# Patient Record
Sex: MALE | Race: WHITE | Employment: OTHER | URBAN - NONMETROPOLITAN AREA
[De-identification: names, ages, dates, MRNs, and addresses within clinical notes are randomized per-mention and may not be internally consistent; named-entity substitution may affect disease eponyms.]

---

## 2017-09-19 ENCOUNTER — OFFICE VISIT (OUTPATIENT)
Dept: CHIROPRACTIC MEDICINE | Facility: OTHER | Age: 67
End: 2017-09-19
Attending: CHIROPRACTOR
Payer: COMMERCIAL

## 2017-09-19 DIAGNOSIS — M99.02 SEGMENTAL AND SOMATIC DYSFUNCTION OF THORACIC REGION: ICD-10-CM

## 2017-09-19 DIAGNOSIS — M99.03 SEGMENTAL AND SOMATIC DYSFUNCTION OF LUMBAR REGION: Primary | ICD-10-CM

## 2017-09-19 DIAGNOSIS — M54.50 ACUTE BILATERAL LOW BACK PAIN WITHOUT SCIATICA: ICD-10-CM

## 2017-09-19 PROCEDURE — 98940 CHIROPRACT MANJ 1-2 REGIONS: CPT | Mod: AT | Performed by: CHIROPRACTOR

## 2017-09-19 NOTE — MR AVS SNAPSHOT
"              After Visit Summary   9/19/2017    Sree Chris    MRN: [id number]           Patient Information     Date Of Birth          1950        Visit Information        Provider Department      9/19/2017 10:40 AM Deo Richardson DC  [facility] [place]        Today's Diagnoses     Segmental and somatic dysfunction of lumbar region    -  1    Acute bilateral low back pain without sciatica        Segmental and somatic dysfunction of thoracic region           Follow-ups after your visit        Your next 10 appointments already scheduled     Sep 25, 2017 10:00 AM CDT   Return Visit with Deo Richardson DC   [facility] [place] ([facility]    [address]  [place] [address]   [phone]              Who to contact     If you have questions or need follow up information about today's clinic visit or your schedule please contact  [facility] [place] directly at [phone].  Normal or non-critical lab and imaging results will be communicated to you by CloudVerticalhart, letter or phone within 4 business days after the clinic has received the results. If you do not hear from us within 7 days, please contact the clinic through MyChart or phone. If you have a critical or abnormal lab result, we will notify you by phone as soon as possible.  Submit refill requests through ArtSetters or call your pharmacy and they will forward the refill request to us. Please allow 3 business days for your refill to be completed.          Additional Information About Your Visit        MyChart Information     ArtSetters lets you send messages to your doctor, view your test results, renew your prescriptions, schedule appointments and more. To sign up, go to [email or web address] . Click on \"Log in\" on the left side of the screen, which will take you to the Welcome page. Then click on \"Sign up Now\" on the right side of the page.     You will be asked to enter the access code listed below, as well as some personal " information. Please follow the directions to create your username and password.     Your access code is: [email or web address]  Expires: 2017  2:43 PM     Your access code will  in 90 days. If you need help or a new code, please call your [place] clinic or [phone].        Care EveryWhere ID     This is your Care EveryWhere ID. This could be used by other organizations to access your [place] medical records  [id number]         Blood Pressure from Last 3 Encounters:   14 127/69   13 177/95    Weight from Last 3 Encounters:   14 196 lb (88.9 kg)              We Performed the Following     CHIROPRAC MANIP,SPINAL,1-2 REGIONS        Primary Care Provider Office Phone # Fax #    Sree Mcclain [phone]       [facility] [address] [facility] [address]        Equal Access to Services     [facility]: Hadii aad ku hadasho Soomaali, waaxda luqadaha, qaybta kaalmada adeegyada, waxay alemin haybirgitn charla doherty . So [facility] [phone].    ATENCIÓN: Si habla español, tiene a camarena disposición servicios gratuitos de asistencia lingüística. Maria Del Carmen al [phone].    We comply with applicable federal civil rights laws and [place] laws. We do not discriminate on the basis of race, color, national origin, age, disability sex, sexual orientation or gender identity.            Thank you!     Thank you for choosing  [facility]  for your care. Our goal is always to provide you with excellent care. Hearing back from our patients is one way we can continue to improve our services. Please take a few minutes to complete the written survey that you may receive in the mail after your visit with us. Thank you!             Your Updated Medication List - Protect others around you: Learn how to safely use, store and throw away your medicines at www.disposemymeds.org.          This list is accurate as of: 17 11:59 PM.  Always use your most recent med list.                    Brand Name Dispense Instructions for use Diagnosis    amLODIPine 5 MG tablet    NORVASC     Take 5 mg by mouth daily        ASPIRIN PO      Take 81 mg by mouth daily        CENTRUM SILVER per tablet      Take 1 tablet by mouth daily        GLIPIZIDE PO      Take 2.5 mg by mouth every morning (before breakfast)        glucosamine-chondroitin 500-400 MG Caps per capsule      Take 3 capsules by mouth daily        LISINOPRIL PO      Take 40 mg by mouth daily.        METFORMIN HCL PO      Take 1,000 mg by mouth 2 times daily (with meals).        OMEGA-3 FISH OIL PO      Take 1 g by mouth 2 times daily (with meals)        PRAVASTATIN SODIUM PO      Take 20 mg by mouth daily.        PRILOSEC PO      Take 40 mg by mouth every morning.        VITAMIN D3 PO      Take 1,000 Units by mouth daily

## 2017-09-21 ENCOUNTER — OFFICE VISIT (OUTPATIENT)
Dept: CHIROPRACTIC MEDICINE | Facility: OTHER | Age: 67
End: 2017-09-21
Attending: CHIROPRACTOR
Payer: COMMERCIAL

## 2017-09-21 DIAGNOSIS — M54.50 ACUTE BILATERAL LOW BACK PAIN WITHOUT SCIATICA: ICD-10-CM

## 2017-09-21 DIAGNOSIS — M99.02 SEGMENTAL AND SOMATIC DYSFUNCTION OF THORACIC REGION: ICD-10-CM

## 2017-09-21 DIAGNOSIS — M99.03 SEGMENTAL AND SOMATIC DYSFUNCTION OF LUMBAR REGION: Primary | ICD-10-CM

## 2017-09-21 PROCEDURE — 98940 CHIROPRACT MANJ 1-2 REGIONS: CPT | Mod: AT | Performed by: CHIROPRACTOR

## 2017-09-21 NOTE — MR AVS SNAPSHOT
"              After Visit Summary   9/21/2017    Sree Chris    MRN: [id number]           Patient Information     Date Of Birth          1950        Visit Information        Provider Department      9/21/2017 9:30 AM Deo Richardson DC  [facility] [place]        Today's Diagnoses     Segmental and somatic dysfunction of lumbar region    -  1    Acute bilateral low back pain without sciatica        Segmental and somatic dysfunction of thoracic region           Follow-ups after your visit        Your next 10 appointments already scheduled     Sep 25, 2017 10:00 AM CDT   Return Visit with Deo Richardson DC   [facility] [place] ([facility])    [address]  [place] [address]   [phone]              Who to contact     If you have questions or need follow up information about today's clinic visit or your schedule please contact  [facility] [place] directly at [phone].  Normal or non-critical lab and imaging results will be communicated to you by Veeboxhart, letter or phone within 4 business days after the clinic has received the results. If you do not hear from us within 7 days, please contact the clinic through MyChart or phone. If you have a critical or abnormal lab result, we will notify you by phone as soon as possible.  Submit refill requests through Gongpingjia or call your pharmacy and they will forward the refill request to us. Please allow 3 business days for your refill to be completed.          Additional Information About Your Visit        MyChart Information     Gongpingjia lets you send messages to your doctor, view your test results, renew your prescriptions, schedule appointments and more. To sign up, go to [email or web address] . Click on \"Log in\" on the left side of the screen, which will take you to the Welcome page. Then click on \"Sign up Now\" on the right side of the page.     You will be asked to enter the access code listed below, as well as some personal " information. Please follow the directions to create your username and password.     Your access code is: [email or web address]  Expires: 2017  2:43 PM     Your access code will  in 90 days. If you need help or a new code, please call your [place] clinic or [phone].        Care EveryWhere ID     This is your Care EveryWhere ID. This could be used by other organizations to access your [place] medical records  [id number]         Blood Pressure from Last 3 Encounters:   14 127/69   13 177/95    Weight from Last 3 Encounters:   14 196 lb (88.9 kg)              We Performed the Following     CHIROPRAC MANIP,SPINAL,1-2 REGIONS        Primary Care Provider Office Phone # Fax #    Sree Mcclain [phone]       [facility] [address] [facility] [address]        Equal Access to Services     [facility]: Hadii aad ku hadasho Soomaali, waaxda luqadaha, qaybta kaalmada adeegyada, waxay alemin haybirgitn charla doherty . So [facility] [phone].    ATENCIÓN: Si habla español, tiene a camarena disposición servicios gratuitos de asistencia lingüística. Maria Del Carmen al [phone].    We comply with applicable federal civil rights laws and [place] laws. We do not discriminate on the basis of race, color, national origin, age, disability sex, sexual orientation or gender identity.            Thank you!     Thank you for choosing  [facility]  for your care. Our goal is always to provide you with excellent care. Hearing back from our patients is one way we can continue to improve our services. Please take a few minutes to complete the written survey that you may receive in the mail after your visit with us. Thank you!             Your Updated Medication List - Protect others around you: Learn how to safely use, store and throw away your medicines at www.disposemymeds.org.          This list is accurate as of: 17 11:59 PM.  Always use your most recent med list.                    Brand Name Dispense Instructions for use Diagnosis    amLODIPine 5 MG tablet    NORVASC     Take 5 mg by mouth daily        ASPIRIN PO      Take 81 mg by mouth daily        CENTRUM SILVER per tablet      Take 1 tablet by mouth daily        GLIPIZIDE PO      Take 2.5 mg by mouth every morning (before breakfast)        glucosamine-chondroitin 500-400 MG Caps per capsule      Take 3 capsules by mouth daily        LISINOPRIL PO      Take 40 mg by mouth daily.        METFORMIN HCL PO      Take 1,000 mg by mouth 2 times daily (with meals).        OMEGA-3 FISH OIL PO      Take 1 g by mouth 2 times daily (with meals)        PRAVASTATIN SODIUM PO      Take 20 mg by mouth daily.        PRILOSEC PO      Take 40 mg by mouth every morning.        VITAMIN D3 PO      Take 1,000 Units by mouth daily

## 2017-09-21 NOTE — PROGRESS NOTES
Subjective Finding:    Chief compalint: No chief complaint on file.  , Pain Scale: 6/10, Intensity: sharp, Duration: 2 weeks, Radiating: bilateral buttock.    Date of injury:     Activities that the pain restricts:   Home/household/hobbies/social activities: yes.  Work duties: yes.  Sleep: no.  Makes symptoms better: rest.  Makes symptoms worse: activity.  Have you seen anyone else for the symptoms? MD.  Work related: no.  Automobile related injury: no.    Objective and Assessment:    Posture Analysis:   High shoulder: .  Head tilt: .  High iliac crest: .  Head carriage: neutral.  Thoracic Kyphosis: neutral.  Lumbar Lordosis: forward.    Lumbar Range of Motion: flexion decreased and extension decreased.  Cervical Range of Motion: .  Thoracic Range of Motion: .  Extremity Range of Motion: .    Palpation:   Quad lumb: bilateral, referred pain: no    Segmental dysfunction pre-treatment and treatment area: T9, L4 and L5.    Assessment post-treatment:  Cervical: .  Thoracic: ROM increased.  Lumbar: ROM increased.    Comments: .      Complicating Factors: .    Procedure(s):  CMT:  27218 Chiropractic manipulative treatment 1-2 regions performed   Thoracic: Diversified, See above for level, Prone and Lumbar: Diversified, See above for level, Side posture    Modalities:  None performed this visit    Therapeutic procedures:  None    Plan:  Treatment plan: PRN.  Instructed patient: stretch as instructed at visit.  Short term goals: reduce pain.  Long term goals: increase ADL.  Prognosis: very good.

## 2017-09-25 ENCOUNTER — OFFICE VISIT (OUTPATIENT)
Dept: CHIROPRACTIC MEDICINE | Facility: OTHER | Age: 67
End: 2017-09-25
Attending: CHIROPRACTOR
Payer: COMMERCIAL

## 2017-09-25 DIAGNOSIS — M99.02 SEGMENTAL AND SOMATIC DYSFUNCTION OF THORACIC REGION: ICD-10-CM

## 2017-09-25 DIAGNOSIS — M99.03 SEGMENTAL AND SOMATIC DYSFUNCTION OF LUMBAR REGION: Primary | ICD-10-CM

## 2017-09-25 DIAGNOSIS — M54.50 ACUTE RIGHT-SIDED LOW BACK PAIN WITHOUT SCIATICA: ICD-10-CM

## 2017-09-25 PROCEDURE — 98940 CHIROPRACT MANJ 1-2 REGIONS: CPT | Mod: AT | Performed by: CHIROPRACTOR

## 2017-09-25 NOTE — PROGRESS NOTES
Subjective Finding:    Chief compalint: Patient presents with:  Back Pain: continued right hip pain  , Pain Scale: 6/10, Intensity: sharp, Duration: 2 weeks, Radiating: bilateral buttock.    Date of injury:     Activities that the pain restricts:   Home/household/hobbies/social activities: yes.  Work duties: yes.  Sleep: no.  Makes symptoms better: rest.  Makes symptoms worse: activity.  Have you seen anyone else for the symptoms? MD.  Work related: no.  Automobile related injury: no.    Objective and Assessment:    Posture Analysis:   High shoulder: .  Head tilt: .  High iliac crest: .  Head carriage: neutral.  Thoracic Kyphosis: neutral.  Lumbar Lordosis: forward.    Lumbar Range of Motion: flexion decreased and extension decreased.  Cervical Range of Motion: .  Thoracic Range of Motion: .  Extremity Range of Motion: .    Palpation:   Quad lumb: bilateral, referred pain: no    Segmental dysfunction pre-treatment and treatment area: T9, L4 and L5.    Assessment post-treatment:  Cervical: .  Thoracic: ROM increased.  Lumbar: ROM increased.    Comments: .      Complicating Factors: .    Procedure(s):  CMT:  96395 Chiropractic manipulative treatment 1-2 regions performed   Thoracic: Diversified, See above for level, Prone and Lumbar: Diversified, See above for level, Side posture    Modalities:  None performed this visit    Therapeutic procedures:  None    Plan:  Treatment plan: PRN.  Instructed patient: stretch as instructed at visit.  Short term goals: reduce pain.  Long term goals: increase ADL.  Prognosis: very good.

## 2017-09-25 NOTE — PROGRESS NOTES
Subjective Finding:    Chief compalint: Patient presents with:  Back Pain  , Pain Scale: 6/10, Intensity: sharp, Duration: 2 weeks, Radiating: bilateral buttock.    Date of injury:     Activities that the pain restricts:   Home/household/hobbies/social activities: yes.  Work duties: yes.  Sleep: no.  Makes symptoms better: rest.  Makes symptoms worse: activity.  Have you seen anyone else for the symptoms? MD.  Work related: no.  Automobile related injury: no.    Objective and Assessment:    Posture Analysis:   High shoulder: .  Head tilt: .  High iliac crest: .  Head carriage: neutral.  Thoracic Kyphosis: neutral.  Lumbar Lordosis: forward.    Lumbar Range of Motion: flexion decreased and extension decreased.  Cervical Range of Motion: .  Thoracic Range of Motion: .  Extremity Range of Motion: .    Palpation:   Quad lumb: bilateral, referred pain: no    Segmental dysfunction pre-treatment and treatment area: T9, L4 and L5.    Assessment post-treatment:  Cervical: .  Thoracic: ROM increased.  Lumbar: ROM increased.    Comments: .      Complicating Factors: .    Procedure(s):  CMT:  17596 Chiropractic manipulative treatment 1-2 regions performed   Thoracic: Diversified, See above for level, Prone and Lumbar: Diversified, See above for level, Side posture    Modalities:  None performed this visit    Therapeutic procedures:  None    Plan:  Treatment plan: PRN.  Instructed patient: stretch as instructed at visit.  Short term goals: reduce pain.  Long term goals: increase ADL.  Prognosis: very good.

## 2017-09-25 NOTE — MR AVS SNAPSHOT
"              After Visit Summary   9/25/2017    Sree Chris    MRN: [id number]           Patient Information     Date Of Birth          1950        Visit Information        Provider Department      9/25/2017 10:00 AM Deo Richardson DC  [facility] [place]        Today's Diagnoses     Segmental and somatic dysfunction of lumbar region    -  1    Acute right-sided low back pain without sciatica        Segmental and somatic dysfunction of thoracic region           Follow-ups after your visit        Your next 10 appointments already scheduled     Sep 28, 2017  2:00 PM CDT   Return Visit with Deo Richardson DC   [facility] [place] ([facility])    [address]  [facility] [address]   [phone]              Who to contact     If you have questions or need follow up information about today's clinic visit or your schedule please contact  [facility] [place] directly at [phone].  Normal or non-critical lab and imaging results will be communicated to you by MyChart, letter or phone within 4 business days after the clinic has received the results. If you do not hear from us within 7 days, please contact the clinic through TechTurnhart or phone. If you have a critical or abnormal lab result, we will notify you by phone as soon as possible.  Submit refill requests through BioVascular or call your pharmacy and they will forward the refill request to us. Please allow 3 business days for your refill to be completed.          Additional Information About Your Visit        MyChart Information     BioVascular lets you send messages to your doctor, view your test results, renew your prescriptions, schedule appointments and more. To sign up, go to [email or web address] . Click on \"Log in\" on the left side of the screen, which will take you to the Welcome page. Then click on \"Sign up Now\" on the right side of the page.     You will be asked to enter the access code listed below, as well as some personal " information. Please follow the directions to create your username and password.     Your access code is: [email or web address]  Expires: 2017  2:43 PM     Your access code will  in 90 days. If you need help or a new code, please call your [place] clinic or [phone].        Care EveryWhere ID     This is your Care EveryWhere ID. This could be used by other organizations to access your [place] medical records  [id number]         Blood Pressure from Last 3 Encounters:   14 127/69   13 177/95    Weight from Last 3 Encounters:   14 196 lb (88.9 kg)              We Performed the Following     CHIROPRAC MANIP,SPINAL,1-2 REGIONS        Primary Care Provider Office Phone # Fax #    Sree Mcclain [phone]       [facility] [address] [facility] [address]        Equal Access to Services     [facility]: Hadii aad ku hadasho Soomaali, waaxda luqadaha, qaybta kaalmada adeegyada, waxay alemin haybirgitn charla doherty . So [facility] [phone].    ATENCIÓN: Si habla español, tiene a camarena disposición servicios gratuitos de asistencia lingüística. Maria Del Carmen al [phone].    We comply with applicable federal civil rights laws and [place] laws. We do not discriminate on the basis of race, color, national origin, age, disability sex, sexual orientation or gender identity.            Thank you!     Thank you for choosing  [facility]  for your care. Our goal is always to provide you with excellent care. Hearing back from our patients is one way we can continue to improve our services. Please take a few minutes to complete the written survey that you may receive in the mail after your visit with us. Thank you!             Your Updated Medication List - Protect others around you: Learn how to safely use, store and throw away your medicines at www.disposemymeds.org.          This list is accurate as of: 17  1:16 PM.  Always use your most recent med list.                    Brand Name Dispense Instructions for use Diagnosis    amLODIPine 5 MG tablet    NORVASC     Take 5 mg by mouth daily        ASPIRIN PO      Take 81 mg by mouth daily        CENTRUM SILVER per tablet      Take 1 tablet by mouth daily        GLIPIZIDE PO      Take 2.5 mg by mouth every morning (before breakfast)        glucosamine-chondroitin 500-400 MG Caps per capsule      Take 3 capsules by mouth daily        LISINOPRIL PO      Take 40 mg by mouth daily.        METFORMIN HCL PO      Take 1,000 mg by mouth 2 times daily (with meals).        OMEGA-3 FISH OIL PO      Take 1 g by mouth 2 times daily (with meals)        PRAVASTATIN SODIUM PO      Take 20 mg by mouth daily.        PRILOSEC PO      Take 40 mg by mouth every morning.        VITAMIN D3 PO      Take 1,000 Units by mouth daily

## 2017-09-28 ENCOUNTER — OFFICE VISIT (OUTPATIENT)
Dept: CHIROPRACTIC MEDICINE | Facility: OTHER | Age: 67
End: 2017-09-28
Attending: CHIROPRACTOR
Payer: COMMERCIAL

## 2017-09-28 DIAGNOSIS — M54.50 ACUTE RIGHT-SIDED LOW BACK PAIN WITHOUT SCIATICA: ICD-10-CM

## 2017-09-28 DIAGNOSIS — M99.02 SEGMENTAL AND SOMATIC DYSFUNCTION OF THORACIC REGION: ICD-10-CM

## 2017-09-28 DIAGNOSIS — M99.03 SEGMENTAL AND SOMATIC DYSFUNCTION OF LUMBAR REGION: Primary | ICD-10-CM

## 2017-09-28 PROCEDURE — 98940 CHIROPRACT MANJ 1-2 REGIONS: CPT | Mod: AT | Performed by: CHIROPRACTOR

## 2017-09-28 NOTE — MR AVS SNAPSHOT
"              After Visit Summary   2017    Sree Chris    MRN: [id number]           Patient Information     Date Of Birth          1950        Visit Information        Provider Department      2017 2:00 PM Deo Richardson DC  [facility]        Today's Diagnoses     Segmental and somatic dysfunction of lumbar region    -  1    Acute right-sided low back pain without sciatica        Segmental and somatic dysfunction of thoracic region           Follow-ups after your visit        Who to contact     If you have questions or need follow up information about today's clinic visit or your schedule please contact  [facility] [place] directly at [phone].  Normal or non-critical lab and imaging results will be communicated to you by So Protect Mehart, letter or phone within 4 business days after the clinic has received the results. If you do not hear from us within 7 days, please contact the clinic through So Protect Mehart or phone. If you have a critical or abnormal lab result, we will notify you by phone as soon as possible.  Submit refill requests through ShadowdCat Consulting or call your pharmacy and they will forward the refill request to us. Please allow 3 business days for your refill to be completed.          Additional Information About Your Visit        MyChart Information     [employer] lets you send messages to your doctor, view your test results, renew your prescriptions, schedule appointments and more. To sign up, go to [email or web address] . Click on \"Log in\" on the left side of the screen, which will take you to the Welcome page. Then click on \"Sign up Now\" on the right side of the page.     You will be asked to enter the access code listed below, as well as some personal information. Please follow the directions to create your username and password.     Your access code is: [email or web address]  Expires: 2017  2:43 PM     Your access code will  in 90 days. If you need help or a new code, please " call your [place] clinic or [phone].        Care EveryWhere ID     This is your Care EveryWhere ID. This could be used by other organizations to access your [place] medical records  [id number]         Blood Pressure from Last 3 Encounters:   08/01/14 127/69   05/19/13 177/95    Weight from Last 3 Encounters:   08/01/14 196 lb (88.9 kg)              We Performed the Following     CHIROPRAC MANIP,SPINAL,1-2 REGIONS        Primary Care Provider Office Phone # Fax #    Sree Mcclain [phone]       [facility] [address] [facility] [address]        Equal Access to Services     [facility]: Hadii aad ku hadasho Soomaali, waaxda luqadaha, qaybta kaalmada adeegyada, waxindira doherty . So [facility] [place] [phone].    ATENCIÓN: Si habla español, tiene a camarena disposición servicios gratuitos de asistencia lingüística. Llame al [phone].    We comply with applicable federal civil rights laws and [place] laws. We do not discriminate on the basis of race, color, national origin, age, disability, sex, sexual orientation, or gender identity.            Thank you!     Thank you for choosing  [facility]  for your care. Our goal is always to provide you with excellent care. Hearing back from our patients is one way we can continue to improve our services. Please take a few minutes to complete the written survey that you may receive in the mail after your visit with us. Thank you!             Your Updated Medication List - Protect others around you: Learn how to safely use, store and throw away your medicines at www.disposemymeds.org.          This list is accurate as of: 9/28/17 11:59 PM.  Always use your most recent med list.                   Brand Name Dispense Instructions for use Diagnosis    amLODIPine 5 MG tablet    NORVASC     Take 5 mg by mouth daily        ASPIRIN PO      Take 81 mg by mouth daily        CENTRUM SILVER per tablet      Take 1 tablet by  mouth daily        GLIPIZIDE PO      Take 2.5 mg by mouth every morning (before breakfast)        glucosamine-chondroitin 500-400 MG Caps per capsule      Take 3 capsules by mouth daily        LISINOPRIL PO      Take 40 mg by mouth daily.        METFORMIN HCL PO      Take 1,000 mg by mouth 2 times daily (with meals).        OMEGA-3 FISH OIL PO      Take 1 g by mouth 2 times daily (with meals)        PRAVASTATIN SODIUM PO      Take 20 mg by mouth daily.        PRILOSEC PO      Take 40 mg by mouth every morning.        VITAMIN D3 PO      Take 1,000 Units by mouth daily

## 2017-10-03 NOTE — PROGRESS NOTES
Subjective Finding:    Chief compalint: Patient presents with:  Back Pain: hip pain.  left side better.  still having right hip pain  , Pain Scale: 6/10, Intensity: sharp, Duration: 2 weeks, Radiating: bilateral buttock.    Date of injury:     Activities that the pain restricts:   Home/household/hobbies/social activities: yes.  Work duties: yes.  Sleep: no.  Makes symptoms better: rest.  Makes symptoms worse: activity.  Have you seen anyone else for the symptoms? MD.  Work related: no.  Automobile related injury: no.    Objective and Assessment:    Posture Analysis:   High shoulder: .  Head tilt: .  High iliac crest: .  Head carriage: neutral.  Thoracic Kyphosis: neutral.  Lumbar Lordosis: forward.    Lumbar Range of Motion: flexion decreased and extension decreased.  Cervical Range of Motion: .  Thoracic Range of Motion: .  Extremity Range of Motion: .    Palpation:   Quad lumb: bilateral, referred pain: no    Segmental dysfunction pre-treatment and treatment area: T9, L4 and L5.    Assessment post-treatment:  Cervical: .  Thoracic: ROM increased.  Lumbar: ROM increased.    Comments: .      Complicating Factors: .    Procedure(s):  CMT:  38479 Chiropractic manipulative treatment 1-2 regions performed   Thoracic: Diversified, See above for level, Prone and Lumbar: Diversified, See above for level, Side posture    Modalities:  None performed this visit    Therapeutic procedures:  None    Plan:  Treatment plan: PRN.  Instructed patient: stretch as instructed at visit.  Short term goals: reduce pain.  Long term goals: increase ADL.  Prognosis: very good.

## 2018-06-16 ENCOUNTER — HOSPITAL ENCOUNTER (EMERGENCY)
Facility: HOSPITAL | Age: 68
Discharge: HOME | End: 2018-06-16
Attending: PHYSICIAN ASSISTANT | Admitting: PHYSICIAN ASSISTANT
Payer: MEDICARE

## 2018-06-16 VITALS
OXYGEN SATURATION: 97 % | HEART RATE: 68 BPM | RESPIRATION RATE: 18 BRPM | DIASTOLIC BLOOD PRESSURE: 58 MMHG | SYSTOLIC BLOOD PRESSURE: 115 MMHG

## 2018-06-16 DIAGNOSIS — Z13.9 SCREENING FOR CONDITION: ICD-10-CM

## 2018-06-16 DIAGNOSIS — R52 BODY ACHES: ICD-10-CM

## 2018-06-16 DIAGNOSIS — M25.50 ARTHRALGIA, UNSPECIFIED JOINT: ICD-10-CM

## 2018-06-16 LAB
BASOPHILS # BLD AUTO: 0 10E9/L (ref 0–0.2)
BASOPHILS NFR BLD AUTO: 0.3 %
DIFFERENTIAL METHOD BLD: ABNORMAL
EOSINOPHIL # BLD AUTO: 0.1 10E9/L (ref 0–0.7)
EOSINOPHIL NFR BLD AUTO: 1.6 %
ERYTHROCYTE [DISTWIDTH] IN BLOOD BY AUTOMATED COUNT: 14.1 % (ref 10–15)
HCT VFR BLD AUTO: 37.9 % (ref 40–53)
HGB BLD-MCNC: 12.8 G/DL (ref 13.3–17.7)
IMM GRANULOCYTES # BLD: 0 10E9/L (ref 0–0.4)
IMM GRANULOCYTES NFR BLD: 0.3 %
LYMPHOCYTES # BLD AUTO: 2.1 10E9/L (ref 0.8–5.3)
LYMPHOCYTES NFR BLD AUTO: 23.8 %
MCH RBC QN AUTO: 31 PG (ref 26.5–33)
MCHC RBC AUTO-ENTMCNC: 33.8 G/DL (ref 31.5–36.5)
MCV RBC AUTO: 92 FL (ref 78–100)
MONOCYTES # BLD AUTO: 1.1 10E9/L (ref 0–1.3)
MONOCYTES NFR BLD AUTO: 12.7 %
NEUTROPHILS # BLD AUTO: 5.4 10E9/L (ref 1.6–8.3)
NEUTROPHILS NFR BLD AUTO: 61.3 %
NRBC # BLD AUTO: 0 10*3/UL
NRBC BLD AUTO-RTO: 0 /100
PLATELET # BLD AUTO: 219 10E9/L (ref 150–450)
RBC # BLD AUTO: 4.13 10E12/L (ref 4.4–5.9)
URATE SERPL-MCNC: 5.5 MG/DL (ref 3.5–7.2)
WBC # BLD AUTO: 8.8 10E9/L (ref 4–11)

## 2018-06-16 PROCEDURE — 87015 SPECIMEN INFECT AGNT CONCNTJ: CPT | Performed by: PHYSICIAN ASSISTANT

## 2018-06-16 PROCEDURE — 86788 WEST NILE VIRUS AB IGM: CPT

## 2018-06-16 PROCEDURE — 86618 LYME DISEASE ANTIBODY: CPT | Performed by: PHYSICIAN ASSISTANT

## 2018-06-16 PROCEDURE — 36415 COLL VENOUS BLD VENIPUNCTURE: CPT | Performed by: PHYSICIAN ASSISTANT

## 2018-06-16 PROCEDURE — G0463 HOSPITAL OUTPT CLINIC VISIT: HCPCS

## 2018-06-16 PROCEDURE — 86789 WEST NILE VIRUS ANTIBODY: CPT

## 2018-06-16 PROCEDURE — 84550 ASSAY OF BLOOD/URIC ACID: CPT | Performed by: PHYSICIAN ASSISTANT

## 2018-06-16 PROCEDURE — 85025 COMPLETE CBC W/AUTO DIFF WBC: CPT | Performed by: PHYSICIAN ASSISTANT

## 2018-06-16 PROCEDURE — 87207 SMEAR SPECIAL STAIN: CPT | Performed by: PHYSICIAN ASSISTANT

## 2018-06-16 PROCEDURE — 99203 OFFICE O/P NEW LOW 30 MIN: CPT | Performed by: PHYSICIAN ASSISTANT

## 2018-06-16 RX ORDER — KETOROLAC TROMETHAMINE 10 MG/1
10 TABLET, FILM COATED ORAL EVERY 6 HOURS PRN
Qty: 20 TABLET | Refills: 0 | Status: SHIPPED | OUTPATIENT
Start: 2018-06-16 | End: 2018-07-12

## 2018-06-16 RX ORDER — DOXYCYCLINE 100 MG/1
100 CAPSULE ORAL 2 TIMES DAILY
Qty: 42 CAPSULE | Refills: 0 | Status: SHIPPED | OUTPATIENT
Start: 2018-06-16 | End: 2019-02-25

## 2018-06-16 ASSESSMENT — ENCOUNTER SYMPTOMS
FEVER: 0
WOUND: 0
ARTHRALGIAS: 1
NECK PAIN: 0
VOMITING: 0
MYALGIAS: 1
PSYCHIATRIC NEGATIVE: 1
BACK PAIN: 0
NAUSEA: 0
RESPIRATORY NEGATIVE: 1
NECK STIFFNESS: 0
CARDIOVASCULAR NEGATIVE: 1

## 2018-06-16 NOTE — ED PROVIDER NOTES
History     Chief Complaint   Patient presents with     Arthritis     states he has a hx of gout.  co pain in both great toes and knees     The history is provided by the patient and the spouse. No  was used.     Sree Chris is a 67 year old male who is worried he has gout. Acutely he has developed multiple joint/bone/muscles aches.  Involves all his toes, both knees/ankles and left elbow. His brother has gout so the pt took 2 days of his brother's allopurinol but this did not help.  Denies any fall or direct injury. Has not travelled outside of [place]. He does not wish to try Prednisone due to his blood sugars.      Past Medical History:    Past Medical History:   Diagnosis Date     Diabetes (H)      High cholesterol      Hypertension        Past Surgical History:    History reviewed. No pertinent surgical history.    Family History:    Gout    Social History:  Marital Status:   [2]  Social History   Substance Use Topics     Smoking status: Never Smoker     Smokeless tobacco: Not on file     Alcohol use No        Medications:      amLODIPine (NORVASC) 5 MG tablet   ASPIRIN PO   Cholecalciferol (VITAMIN D3 PO)   doxycycline (VIBRAMYCIN) 100 MG capsule   GLIPIZIDE PO   ketorolac (TORADOL) 10 MG tablet   LISINOPRIL PO   METFORMIN HCL PO   Multiple Vitamins-Minerals (CENTRUM SILVER) per tablet   Omeprazole (PRILOSEC PO)   PRAVASTATIN SODIUM PO         Review of Systems   Constitutional: Negative for fever.   Respiratory: Negative.    Cardiovascular: Negative.    Gastrointestinal: Negative for nausea and vomiting.   Musculoskeletal: Positive for arthralgias, gait problem and myalgias. Negative for back pain, neck pain and neck stiffness.   Skin: Negative for rash and wound.   Psychiatric/Behavioral: Negative.        Physical Exam   BP: 115/58  Pulse: 68  Resp: 18  SpO2: 97 %      Physical Exam   Constitutional: He is oriented to person, place, and time. He appears well-developed and  well-nourished. No distress.   Cardiovascular: Normal rate.    Pulmonary/Chest: Effort normal.   Musculoskeletal:     Bilateral toes/feet/knees/left elbow: +AFROM, m/n/v intact, +AFROM w/ pain, moderate diffuse TTP. No edema/erythema/ecchymosis.    Neurological: He is alert and oriented to person, place, and time.   Skin: Skin is warm and dry. He is not diaphoretic.   Psychiatric: He has a normal mood and affect.   Nursing note and vitals reviewed.      ED Course     ED Course     Procedures               Results for orders placed or performed during the hospital encounter of 06/16/18 (from the past 24 hour(s))   CBC with platelets differential   Result Value Ref Range    WBC 8.8 4.0 - 11.0 10e9/L    RBC Count 4.13 (L) 4.4 - 5.9 10e12/L    Hemoglobin 12.8 (L) 13.3 - 17.7 g/dL    Hematocrit 37.9 (L) 40.0 - 53.0 %    MCV 92 78 - 100 fl    MCH 31.0 26.5 - 33.0 pg    MCHC 33.8 31.5 - 36.5 g/dL    RDW 14.1 10.0 - 15.0 %    Platelet Count 219 150 - 450 10e9/L    Diff Method Automated Method     % Neutrophils 61.3 %    % Lymphocytes 23.8 %    % Monocytes 12.7 %    % Eosinophils 1.6 %    % Basophils 0.3 %    % Immature Granulocytes 0.3 %    Nucleated RBCs 0 0 /100    Absolute Neutrophil 5.4 1.6 - 8.3 10e9/L    Absolute Lymphocytes 2.1 0.8 - 5.3 10e9/L    Absolute Monocytes 1.1 0.0 - 1.3 10e9/L    Absolute Eosinophils 0.1 0.0 - 0.7 10e9/L    Absolute Basophils 0.0 0.0 - 0.2 10e9/L    Abs Immature Granulocytes 0.0 0 - 0.4 10e9/L    Absolute Nucleated RBC 0.0    Uric acid   Result Value Ref Range    Uric Acid 5.5 3.5 - 7.2 mg/dL           Assessments & Plan (with Medical Decision Making)     I have reviewed the nursing notes.    I have reviewed the findings, diagnosis, plan and need for follow up with the patient.      Discharge Medication List as of 6/16/2018 12:43 PM      START taking these medications    Details   doxycycline (VIBRAMYCIN) 100 MG capsule Take 1 capsule (100 mg) by mouth 2 times daily for 21 days, Disp-42  capsule, R-0, E-Prescribe      ketorolac (TORADOL) 10 MG tablet Take 1 tablet (10 mg) by mouth every 6 hours as needed for moderate pain, Disp-20 tablet, R-0, E-Prescribe             Final diagnoses:   Body aches   Screening for condition - Uric acid negative  CBC normal  Tick/mosquito disease screening pending   Arthralgia, unspecified joint           Patient/wife verbally educated and given appropriate education sheets for the diagnoses and has no questions.  Take medications as directed.   Follow up with your Primary Care provider in one week for reevaluation, sooner if symptoms increase. if further concerns develop, return to the ER  Winsome York Certified  Physician Assistant  6/16/2018  1:22 PM  URGENT CARE CLINIC      6/16/2018   [facility]     Winsome York PA  06/16/18 [id number]

## 2018-06-16 NOTE — ED TRIAGE NOTES
Pt presents today with significant other for c/o joint pain in multiple areas but mostly in his bilateral great toes, he has a Hx of gout, but no prn's for it.

## 2018-06-16 NOTE — ED AVS SNAPSHOT
[facility]    [address]    [place] [address]    Phone:  [phone]                                       Sree Chris   MRN: [id number]    Department:  [facility]   Date of Visit:  6/16/2018           After Visit Summary Signature Page     I have received my discharge instructions, and my questions have been answered. I have discussed any challenges I see with this plan with the nurse or doctor.    ..........................................................................................................................................  Patient/Patient Representative Signature      ..........................................................................................................................................  Patient Representative Print Name and Relationship to Patient    ..................................................               ................................................  Date                                            Time    ..........................................................................................................................................  Reviewed by Signature/Title    ...................................................              ..............................................  Date                                                            Time

## 2018-06-16 NOTE — ED AVS SNAPSHOT
[facility]    [address]    [place] [address]    Phone:  [phone]                                       Sree Chirs   MRN: [id number]    Department:  [facility]   Date of Visit:  6/16/2018           Patient Information     Date Of Birth          1950        Your diagnoses for this visit were:     Body aches     Screening for condition Uric acid negative  CBC normal  Tick/mosquito disease screening pending    Arthralgia, unspecified joint        You were seen by Winsome York PA.      Follow-up Information     Follow up with Horacio Mcrae MD In 1 week.    Specialty:  Family Practice    Why:  For reevaluation, sooner if your symptoms increase    Contact information:    [facility]  [address] [facility] [address]  [phone]          Follow up with [facility].    Specialty:  EMERGENCY MEDICINE    Why:  if further concerns develop    Contact information:    [address]  [place] [place] [address] [phone]    Additional information:    From [place]: Take [address]. Turn left at [address]. Turn left at the first stoplight on [address] [place] [address]. At the first stop sign, take a right onto [address]. Take a left into the parking lot and continue through until you reach the North enterance of the building.       From [place]: Take [address]. Take the [address] ramp towards [facility] [employer]. Turn left onto [address]. Take a slight right onto [address] [facility]. Turn left at the first stoplight on [address]. At the first stop sign, take a right onto [address]. Take a left into the parking lot and continue through until you reach the North enterance of the building.       From [place]: Take [address]. Take a right at [address]. At the first stop sign, take a right onto [address]. Take a left into the parking lot and continue through until you  reach the North enterance of the building.         Discharge Instructions       Use sunscreen while you are on this medication.    Discharge References/Attachments     LYME DISEASE (ENGLISH)    EHRLICHIOSIS, UNDERSTANDING (ENGLISH)    TICK FACTS (ENGLISH)    TICK BITE, ABX TX (ENGLISH)    VIRUS, WEST NILE (ENGLISH)    WEST NILE VIRUS FACT SHEET (ENGLISH)         Review of your medicines      START taking        Dose / Directions Last dose taken    doxycycline 100 MG capsule   Commonly known as:  VIBRAMYCIN   Dose:  100 mg   Quantity:  42 capsule        Take 1 capsule (100 mg) by mouth 2 times daily for 21 days   Refills:  0        ketorolac 10 MG tablet   Commonly known as:  TORADOL   Dose:  10 mg   Quantity:  20 tablet        Take 1 tablet (10 mg) by mouth every 6 hours as needed for moderate pain   Refills:  0          Our records show that you are taking the medicines listed below. If these are incorrect, please call your family doctor or clinic.        Dose / Directions Last dose taken    amLODIPine 5 MG tablet   Commonly known as:  NORVASC   Dose:  5 mg        Take 5 mg by mouth daily   Refills:  0        ASPIRIN PO   Dose:  81 mg        Take 81 mg by mouth daily   Refills:  0        CENTRUM SILVER per tablet   Dose:  1 tablet        Take 1 tablet by mouth daily   Refills:  0        GLIPIZIDE PO   Dose:  2.5 mg        Take 2.5 mg by mouth every morning (before breakfast)   Refills:  0        LISINOPRIL PO   Dose:  40 mg        Take 40 mg by mouth daily.   Refills:  0        METFORMIN HCL PO   Dose:  1000 mg        Take 1,000 mg by mouth 2 times daily (with meals).   Refills:  0        PRAVASTATIN SODIUM PO   Dose:  20 mg        Take 20 mg by mouth daily.   Refills:  0        PRILOSEC PO   Dose:  40 mg        Take 40 mg by mouth every morning.   Refills:  0        VITAMIN D3 PO   Dose:  1000 Units        Take 1,000 Units by mouth daily   Refills:  0                Prescriptions were sent or printed at these  "locations (2 Prescriptions)                   [facility] - [place], [place] - [address] AT [facility] [address]   [address], [place] [address]    Telephone:  [phone]   Fax:  [phone]   Hours:                  E-Prescribed (2 of 2)         doxycycline (VIBRAMYCIN) 100 MG capsule               ketorolac (TORADOL) 10 MG tablet                Procedures and tests performed during your visit     CBC with platelets differential    Uric acid      Orders Needing Specimen Collection     None      Pending Results     No orders found from 2018 to 2018.            Pending Culture Results     No orders found from 2018 to 2018.            Thank you for choosing [facility]       Thank you for choosing [facility] for your care. Our goal is always to provide you with excellent care. Hearing back from our patients is one way we can continue to improve our services. Please take a few minutes to complete the written survey that you may receive in the mail after you visit with us. Thank you!        [facility] Information     [facility] lets you send messages to your doctor, view your test results, renew your prescriptions, schedule appointments and more. To sign up, go to [email or web address] . Click on \"Log in\" on the left side of the screen, which will take you to the Welcome page. Then click on \"Sign up Now\" on the right side of the page.     You will be asked to enter the access code listed below, as well as some personal information. Please follow the directions to create your username and password.     Your access code is: [email or web address]  Expires: 2018 12:41 PM     Your access code will  in 90 days. If you need help or a new code, please call your [facility] clinic or [phone].        Care EveryWhere ID     This is your Care EveryWhere ID. This could be used by other organizations to access your [facility] medical records  [id number]        Equal Access to Services     " WILBERT GALLARDO : Hadii hansel Esquivel, waaxda luqadaha, qaybta kaalmada mikie, madi mccrary. So [facility] [phone].    ATENCIÓN: Si habla español, tiene a camarena disposición servicios gratuitos de asistencia lingüística. Llame al [phone].    We comply with applicable federal civil rights laws and [place] laws. We do not discriminate on the basis of race, color, national origin, age, disability, sex, sexual orientation, or gender identity.            After Visit Summary       This is your record. Keep this with you and show to your community pharmacist(s) and doctor(s) at your next visit.

## 2018-06-19 LAB — B BURGDOR IGG+IGM SER QL: 0.15 (ref 0–0.89)

## 2018-06-19 NOTE — PROGRESS NOTES
I have discussed the negative Lyme results and the preliminary parasite results.  Pt asked what should he do if the final results are all negative. I instructed him to f/f with his PCP. Pt has no further questions.  Winsome York Certified  Physician Assistant  6/19/2018  3:50 PM  UR[facility]ENT [facility]ARE CLINIC

## 2018-06-20 ENCOUNTER — TELEPHONE (OUTPATIENT)
Dept: EMERGENCY MEDICINE | Facility: HOSPITAL | Age: 68
End: 2018-06-20

## 2018-06-20 LAB
PARASITE SPEC INSPECT: NORMAL
SPECIMEN SOURCE: NORMAL

## 2018-06-21 LAB
WNV IGG SER-ACNC: 0.2 IV
WNV IGM SER-ACNC: 0.03 IV

## 2018-06-21 NOTE — ED NOTES
Nurse called patient with negative Lyme results and per provide Yulisa Boggs all tick results negative.

## 2018-07-12 ENCOUNTER — APPOINTMENT (OUTPATIENT)
Dept: GENERAL RADIOLOGY | Facility: HOSPITAL | Age: 68
End: 2018-07-12
Attending: PHYSICIAN ASSISTANT
Payer: MEDICARE

## 2018-07-12 ENCOUNTER — APPOINTMENT (OUTPATIENT)
Dept: CT IMAGING | Facility: HOSPITAL | Age: 68
End: 2018-07-12
Attending: PHYSICIAN ASSISTANT
Payer: MEDICARE

## 2018-07-12 ENCOUNTER — HOSPITAL ENCOUNTER (EMERGENCY)
Facility: HOSPITAL | Age: 68
Discharge: HOME | End: 2018-07-12
Attending: PHYSICIAN ASSISTANT | Admitting: PHYSICIAN ASSISTANT
Payer: MEDICARE

## 2018-07-12 VITALS
SYSTOLIC BLOOD PRESSURE: 129 MMHG | HEART RATE: 78 BPM | RESPIRATION RATE: 16 BRPM | OXYGEN SATURATION: 97 % | DIASTOLIC BLOOD PRESSURE: 81 MMHG | TEMPERATURE: 98.4 F

## 2018-07-12 DIAGNOSIS — S39.013A STRAIN OF LEFT INGUINAL MUSCLE, INITIAL ENCOUNTER: ICD-10-CM

## 2018-07-12 LAB
ALBUMIN SERPL-MCNC: 4.1 G/DL (ref 3.4–5)
ALP SERPL-CCNC: 83 U/L (ref 40–150)
ALT SERPL W P-5'-P-CCNC: 48 U/L (ref 0–70)
ANION GAP SERPL CALCULATED.3IONS-SCNC: 14 MMOL/L (ref 3–14)
AST SERPL W P-5'-P-CCNC: 32 U/L (ref 0–45)
BASOPHILS # BLD AUTO: 0 10E9/L (ref 0–0.2)
BASOPHILS NFR BLD AUTO: 0.4 %
BILIRUB SERPL-MCNC: 0.5 MG/DL (ref 0.2–1.3)
BUN SERPL-MCNC: 23 MG/DL (ref 7–30)
CALCIUM SERPL-MCNC: 9.5 MG/DL (ref 8.5–10.1)
CHLORIDE SERPL-SCNC: 104 MMOL/L (ref 94–109)
CO2 SERPL-SCNC: 23 MMOL/L (ref 20–32)
CREAT SERPL-MCNC: 1.34 MG/DL (ref 0.66–1.25)
DIFFERENTIAL METHOD BLD: ABNORMAL
EOSINOPHIL # BLD AUTO: 0.1 10E9/L (ref 0–0.7)
EOSINOPHIL NFR BLD AUTO: 1.4 %
ERYTHROCYTE [DISTWIDTH] IN BLOOD BY AUTOMATED COUNT: 13.8 % (ref 10–15)
GFR SERPL CREATININE-BSD FRML MDRD: 53 ML/MIN/1.7M2
GLUCOSE SERPL-MCNC: 155 MG/DL (ref 70–99)
HCT VFR BLD AUTO: 36.8 % (ref 40–53)
HGB BLD-MCNC: 12.5 G/DL (ref 13.3–17.7)
IMM GRANULOCYTES # BLD: 0.1 10E9/L (ref 0–0.4)
IMM GRANULOCYTES NFR BLD: 0.6 %
LYMPHOCYTES # BLD AUTO: 1.3 10E9/L (ref 0.8–5.3)
LYMPHOCYTES NFR BLD AUTO: 12.8 %
MCH RBC QN AUTO: 30.3 PG (ref 26.5–33)
MCHC RBC AUTO-ENTMCNC: 34 G/DL (ref 31.5–36.5)
MCV RBC AUTO: 89 FL (ref 78–100)
MONOCYTES # BLD AUTO: 0.9 10E9/L (ref 0–1.3)
MONOCYTES NFR BLD AUTO: 9.3 %
NEUTROPHILS # BLD AUTO: 7.5 10E9/L (ref 1.6–8.3)
NEUTROPHILS NFR BLD AUTO: 75.5 %
NRBC # BLD AUTO: 0 10*3/UL
NRBC BLD AUTO-RTO: 0 /100
PLATELET # BLD AUTO: 228 10E9/L (ref 150–450)
POTASSIUM SERPL-SCNC: 4.1 MMOL/L (ref 3.4–5.3)
PROT SERPL-MCNC: 7.5 G/DL (ref 6.8–8.8)
RBC # BLD AUTO: 4.12 10E12/L (ref 4.4–5.9)
SODIUM SERPL-SCNC: 141 MMOL/L (ref 133–144)
WBC # BLD AUTO: 10 10E9/L (ref 4–11)

## 2018-07-12 PROCEDURE — 72192 CT PELVIS W/O DYE: CPT | Mod: TC

## 2018-07-12 PROCEDURE — 96374 THER/PROPH/DIAG INJ IV PUSH: CPT

## 2018-07-12 PROCEDURE — 96375 TX/PRO/DX INJ NEW DRUG ADDON: CPT

## 2018-07-12 PROCEDURE — 85025 COMPLETE CBC W/AUTO DIFF WBC: CPT | Performed by: PHYSICIAN ASSISTANT

## 2018-07-12 PROCEDURE — 99285 EMERGENCY DEPT VISIT HI MDM: CPT | Mod: 25

## 2018-07-12 PROCEDURE — 96376 TX/PRO/DX INJ SAME DRUG ADON: CPT

## 2018-07-12 PROCEDURE — 73502 X-RAY EXAM HIP UNI 2-3 VIEWS: CPT | Mod: TC

## 2018-07-12 PROCEDURE — 36415 COLL VENOUS BLD VENIPUNCTURE: CPT | Performed by: PHYSICIAN ASSISTANT

## 2018-07-12 PROCEDURE — 99283 EMERGENCY DEPT VISIT LOW MDM: CPT | Performed by: PHYSICIAN ASSISTANT

## 2018-07-12 PROCEDURE — 80053 COMPREHEN METABOLIC PANEL: CPT | Performed by: PHYSICIAN ASSISTANT

## 2018-07-12 PROCEDURE — 25000128 H RX IP 250 OP 636: Performed by: PHYSICIAN ASSISTANT

## 2018-07-12 RX ORDER — MORPHINE SULFATE 2 MG/ML
4 INJECTION, SOLUTION INTRAMUSCULAR; INTRAVENOUS ONCE
Status: COMPLETED | OUTPATIENT
Start: 2018-07-12 | End: 2018-07-12

## 2018-07-12 RX ORDER — SULFASALAZINE 500 MG/1
500 TABLET ORAL 2 TIMES DAILY
COMMUNITY

## 2018-07-12 RX ORDER — ONDANSETRON 2 MG/ML
4 INJECTION INTRAMUSCULAR; INTRAVENOUS ONCE
Status: COMPLETED | OUTPATIENT
Start: 2018-07-12 | End: 2018-07-12

## 2018-07-12 RX ORDER — HYDROCODONE BITARTRATE AND ACETAMINOPHEN 5; 325 MG/1; MG/1
1 TABLET ORAL EVERY 4 HOURS PRN
Qty: 18 TABLET | Refills: 0 | Status: SHIPPED | OUTPATIENT
Start: 2018-07-12 | End: 2019-02-25

## 2018-07-12 RX ADMIN — ONDANSETRON 4 MG: 2 INJECTION, SOLUTION INTRAMUSCULAR; INTRAVENOUS at 17:33

## 2018-07-12 RX ADMIN — MORPHINE SULFATE 4 MG: 2 INJECTION, SOLUTION INTRAMUSCULAR; INTRAVENOUS at 18:36

## 2018-07-12 RX ADMIN — MORPHINE SULFATE 4 MG: 2 INJECTION, SOLUTION INTRAMUSCULAR; INTRAVENOUS at 17:35

## 2018-07-12 NOTE — ED AVS SNAPSHOT
[facility]    [address]    [place] [address]    Phone:  [phone]                                       Sree Chris   MRN: [id number]    Department:  [facility]   Date of Visit:  7/12/2018           After Visit Summary Signature Page     I have received my discharge instructions, and my questions have been answered. I have discussed any challenges I see with this plan with the nurse or doctor.    ..........................................................................................................................................  Patient/Patient Representative Signature      ..........................................................................................................................................  Patient Representative Print Name and Relationship to Patient    ..................................................               ................................................  Date                                            Time    ..........................................................................................................................................  Reviewed by Signature/Title    ...................................................              ..............................................  Date                                                            Time

## 2018-07-12 NOTE — ED AVS SNAPSHOT
[facility]    [address] [address]    Phone:  [phone]                                       Sree Chris   MRN: [id number]    Department:  [facility]   Date of Visit:  7/12/2018           Patient Information     Date Of Birth          1950        Your diagnoses for this visit were:     Strain of left inguinal muscle, initial encounter        You were seen by Christina [place] PA-C.      Follow-up Information     Follow up with Horacio Mcrae MD In 4 days.    Specialty:  Family Practice    Contact information:    [facility]  [address] [facility] [place] [address]  [phone]          Follow up with [facility].    Specialty:  EMERGENCY MEDICINE    Why:  If symptoms worsen    Contact information:    [address] [address] [phone]    Additional information:    From [facility]: Take [address]. Turn left at [address]. Turn left at the first stoplight on [address]. At the first stop sign, take a right onto [address]. Take a left into the parking lot and continue through until you reach the North enterance of the building.       From [place]: Take [address]. Take the [address] ramp towards [place]. Turn left onto [address]. Take a slight right onto [address]. Turn left at the first stoplight on [address]. At the first stop sign, take a right onto [address]. Take a left into the parking lot and continue through until you reach the North enterance of the building.       From [place]: Take [address]. Take a right at [address]. At the first stop sign, take a right onto [address]. Take a left into the parking lot and continue through until you reach the North enterance of the building.         Discharge Instructions       Take the Norco as prescribed for pain. Alternate between heat and ice. Follow up  with primary care on Monday for re-check. Return here sooner with any new or worsening symptoms.     Treating Strains and Sprains  Strains and sprains happen when muscles or other soft tissues near your bones stretch or tear. These injuries can cause bruising, swelling, and pain. To ease your discomfort and speed the healing of your strain or sprain, follow the tips below. Remember, a strain or sprain can take 6 to 8 weeks to heal.     Important Note: Do not give aspirin to children or teens without discussing it with your healthcare provider first.        Ice first, heat later    Use ice for the first 24 to 48 hours after injury. Ice helps prevent swelling and reduce pain. Ice the injury for no more than 20 minutes at a time and allow at least 20 minutes between icing sessions.    Apply heat after the first 72 hours, once the swelling has gone down. Heat relaxes muscles and increases blood flow. Soak the injured area in warm water or use a heating pad set on low for no more than 15 minutes at a time.  Wrap and elevate    Wrap an injured limb firmly with an elastic bandage. This provides support and helps prevent swelling. Don t wear an elastic bandage overnight. Watch for tingling, numbness, or increased pain. Remove the bandage immediately if any of these occurs.    Elevate the injured area to help reduce swelling and throbbing. It s best to raise an injured limb above the level of your heart.     Medicines    Over-the-counter medicines such as acetaminophen or ibuprofen can help reduce pain. Some also help reduce swelling.    Take medicine only as directed.    Rest the area even if medicines are controlling the pain.  Rest    Rest the injured area by not using it for 24 hours.    When you re ready, return slowly to your normal activities. Rest the injured area often.    Don t use or walk on an injured limb if it hurts.  Date Last Reviewed: 1/1/2018 2000-2017 [employer]. [address] [facility],  [place] [address]. All rights reserved. This information is not intended as a substitute for professional medical care. Always follow your healthcare professional's instructions.             Review of your medicines      START taking        Dose / Directions Last dose taken    HYDROcodone-acetaminophen 5-325 MG per tablet   Commonly known as:  NORCO   Dose:  1 tablet   Quantity:  18 tablet        Take 1 tablet by mouth every 4 hours as needed for pain   Refills:  0          Our records show that you are taking the medicines listed below. If these are incorrect, please call your family doctor or clinic.        Dose / Directions Last dose taken    amLODIPine 5 MG tablet   Commonly known as:  NORVASC   Dose:  5 mg        Take 5 mg by mouth daily   Refills:  0        ASPIRIN PO   Dose:  81 mg        Take 81 mg by mouth daily   Refills:  0        ATORVASTATIN CALCIUM PO   Dose:  40 mg        Take 40 mg by mouth daily   Refills:  0        CENTRUM SILVER per tablet   Dose:  1 tablet        Take 1 tablet by mouth daily   Refills:  0        ESCITALOPRAM OXALATE PO   Dose:  10 mg        Take 10 mg by mouth At Bedtime   Refills:  0        LISINOPRIL PO   Dose:  40 mg        Take 40 mg by mouth daily.   Refills:  0        METFORMIN HCL PO   Dose:  1000 mg        Take 1,000 mg by mouth 2 times daily (with meals).   Refills:  0        PRILOSEC PO   Dose:  40 mg        Take 40 mg by mouth every morning.   Refills:  0        sulfaSALAzine 500 MG tablet   Commonly known as:  AZULFIDINE   Dose:  500 mg        Take 500 mg by mouth 2 times daily   Refills:  0        VITAMIN D3 PO   Dose:  1000 Units        Take 1,000 Units by mouth daily   Refills:  0                Information about OPIOIDS     PRESCRIPTION OPIOIDS: WHAT YOU NEED TO KNOW   We gave you an opioid (narcotic) pain medicine. It is important to manage your pain, but opioids are not always the best choice. You should first try all the other options your care team gave you.  Take this medicine for as short a time (and as few doses) as possible.     These medicines have risks:    DO NOT drive when on new or higher doses of pain medicine. These medicines can affect your alertness and reaction times, and you could be arrested for driving under the influence (DUI). If you need to use opioids long-term, talk to your care team about driving.    DO NOT operate heave machinery    DO NOT do any other dangerous activities while taking these medicines.     DO NOT drink any alcohol while taking these medicines.      If the opioid prescribed includes acetaminophen, DO NOT take with any other medicines that contain acetaminophen. Read all labels carefully. Look for the word  acetaminophen  or  Tylenol.  Ask your pharmacist if you have questions or are unsure.    You can get addicted to pain medicines, especially if you have a history of addiction (chemical, alcohol or substance dependence). Talk to your care team about ways to reduce this risk.    Store your pills in a secure place, locked if possible. We will not replace any lost or stolen medicine. If you don t finish your medicine, please throw away (dispose) as directed by your pharmacist. The [place] Pollution Control Agency has more information about safe disposal: https://www.pca.[facility].mn.us/living-green/managing-unwanted-medications.     All opioids tend to cause constipation. Drink plenty of water and eat foods that have a lot of fiber, such as fruits, vegetables, prune juice, apple juice and high-fiber cereal. Take a laxative (Miralax, milk of magnesia, Colace, Senna) if you don t move your bowels at least every other day.         Prescriptions were sent or printed at these locations (1 Prescription)                   [facility] [id number]  [place] - [address] AT [facility] [address] [place] [address]    Telephone:  [phone]   Fax:  [phone]   Hours:                  Printed at Department/Unit  "printer (1 of 1)         HYDROcodone-acetaminophen (NORCO) 5-325 MG per tablet                Procedures and tests performed during your visit     CBC with platelets differential    CT Pelvis w/o Contrast    Comprehensive metabolic panel    Peripheral IV catheter    XR Pelvis and Hip Left 2 Views      Orders Needing Specimen Collection     None      Pending Results     No orders found from 7/10/2018 to 2018.            Pending Culture Results     No orders found from 7/10/2018 to 2018.            Thank you for choosing [facility]       Thank you for choosing [facility] for your care. Our goal is always to provide you with excellent care. Hearing back from our patients is one way we can continue to improve our services. Please take a few minutes to complete the written survey that you may receive in the mail after you visit with us. Thank you!        [employer] Information     [employer] lets you send messages to your doctor, view your test results, renew your prescriptions, schedule appointments and more. To sign up, go to [email or web address] . Click on \"Log in\" on the left side of the screen, which will take you to the Welcome page. Then click on \"Sign up Now\" on the right side of the page.     You will be asked to enter the access code listed below, as well as some personal information. Please follow the directions to create your username and password.     Your access code is: [email or web address]  Expires: 2018 12:41 PM     Your access code will  in 90 days. If you need help or a new code, please call your [facility] clinic or [phone].        Care EveryWhere ID     This is your Care EveryWhere ID. This could be used by other organizations to access your [facility] medical records  [id number]        Equal Access to Services     [facility] PAULINA : Ximena Esquivel, marni sebastian, madi dimas. So [facility] [phone].    ATENCIÓN: Si habla " español, tiene a camarena disposición servicios gratuitos de asistencia lingüística. Maria Del Carmen al [phone].    We comply with applicable federal civil rights laws and [place] laws. We do not discriminate on the basis of race, color, national origin, age, disability, sex, sexual orientation, or gender identity.            After Visit Summary       This is your record. Keep this with you and show to your community pharmacist(s) and doctor(s) at your next visit.

## 2018-07-12 NOTE — ED NOTES
Exam per Farideh and Dr Colvin.  Pt alert and oriented co some dizziness and left groin pain after fall.  resp non labored. Legs in good alignment.

## 2018-07-13 NOTE — DISCHARGE INSTRUCTIONS
Take the Norco as prescribed for pain. Alternate between heat and ice. Follow up with primary care on Monday for re-check. Return here sooner with any new or worsening symptoms.     Treating Strains and Sprains  Strains and sprains happen when muscles or other soft tissues near your bones stretch or tear. These injuries can cause bruising, swelling, and pain. To ease your discomfort and speed the healing of your strain or sprain, follow the tips below. Remember, a strain or sprain can take 6 to 8 weeks to heal.     Important Note: Do not give aspirin to children or teens without discussing it with your healthcare provider first.        Ice first, heat later    Use ice for the first 24 to 48 hours after injury. Ice helps prevent swelling and reduce pain. Ice the injury for no more than 20 minutes at a time and allow at least 20 minutes between icing sessions.    Apply heat after the first 72 hours, once the swelling has gone down. Heat relaxes muscles and increases blood flow. Soak the injured area in warm water or use a heating pad set on low for no more than 15 minutes at a time.  Wrap and elevate    Wrap an injured limb firmly with an elastic bandage. This provides support and helps prevent swelling. Don t wear an elastic bandage overnight. Watch for tingling, numbness, or increased pain. Remove the bandage immediately if any of these occurs.    Elevate the injured area to help reduce swelling and throbbing. It s best to raise an injured limb above the level of your heart.     Medicines    Over-the-counter medicines such as acetaminophen or ibuprofen can help reduce pain. Some also help reduce swelling.    Take medicine only as directed.    Rest the area even if medicines are controlling the pain.  Rest    Rest the injured area by not using it for 24 hours.    When you re ready, return slowly to your normal activities. Rest the injured area often.    Don t use or walk on an injured limb if it hurts.  Date Last  Reviewed: 1/1/2018 2000-2017 [employer]. [address] [place] [address], [place] [address]. All rights reserved. This information is not intended as a substitute for professional medical care. Always follow your healthcare professional's instructions.

## 2018-07-13 NOTE — ED PROVIDER NOTES
History     Chief Complaint   Patient presents with     Fall     loading a tractor in a truck and it started to come back at him he jumped back caught his foot landed on his back, co pain in his left groin states he cant pick it up but can bear weight on it.  thinks he may have hit the back of his head, no loc. co feeling dizzy     HPI  Sree Chris is a 67 year old male who presents with left groin pain after he fell backwards while loading a tractor into his truck. He denies head trauma or other injuries. He states he is unable to lift his left leg due to the left hip/groin pain.     Trauma evaluation was called and Dr. Blackman was in the room with me performing the exam. Pt was then handed off to my care.     Problem List:    There are no active problems to display for this patient.       Past Medical History:    Past Medical History:   Diagnosis Date     Diabetes (H)      High cholesterol      Hypertension        Past Surgical History:    No past surgical history on file.    Family History:    No family history on file.    Social History:  Marital Status:   [2]  Social History   Substance Use Topics     Smoking status: Never Smoker     Smokeless tobacco: Not on file     Alcohol use No        Medications:      amLODIPine (NORVASC) 5 MG tablet   ASPIRIN PO   ATORVASTATIN CALCIUM PO   Cholecalciferol (VITAMIN D3 PO)   ESCITALOPRAM OXALATE PO   HYDROcodone-acetaminophen (NORCO) 5-325 MG per tablet   LISINOPRIL PO   METFORMIN HCL PO   Multiple Vitamins-Minerals (CENTRUM SILVER) per tablet   Omeprazole (PRILOSEC PO)   sulfaSALAzine (AZULFIDINE) 500 MG tablet         Review of Systems   All other systems reviewed and are negative.      Physical Exam   BP: 148/79  Pulse: 79  Heart Rate: 82  Temp: 98.6  F (37  C)  Resp: 16  SpO2: 99 %      Physical Exam   Constitutional: He is oriented to person, place, and time. He appears well-developed and well-nourished. He appears distressed.   HENT:   Head:  Normocephalic and atraumatic. Head is without raccoon's eyes, without Ramírez's sign and without contusion.   Right Ear: Hearing, tympanic membrane, external ear and ear canal normal. No hemotympanum.   Left Ear: Hearing, tympanic membrane, external ear and ear canal normal. No hemotympanum.   Nose: Nose normal. No rhinorrhea.   Mouth/Throat: Uvula is midline, oropharynx is clear and moist and mucous membranes are normal.   Eyes: EOM are normal. Pupils are equal, round, and reactive to light.   Cardiovascular: Normal rate, regular rhythm and normal heart sounds.    Pulmonary/Chest: Breath sounds normal. No respiratory distress. He exhibits no tenderness.   Abdominal: Soft. Bowel sounds are normal. There is no tenderness.   Musculoskeletal:        Left hip: He exhibits tenderness. He exhibits normal range of motion, normal strength, no swelling, no crepitus and no deformity.        Left knee: Normal.        Cervical back: He exhibits no tenderness.        Thoracic back: He exhibits no tenderness.        Lumbar back: He exhibits no tenderness.        Left upper leg: Normal.        Legs:  Neurological: He is alert and oriented to person, place, and time.   Skin: No abrasion and no laceration noted. He is not diaphoretic.   Psychiatric: He has a normal mood and affect. His behavior is normal.   Nursing note and vitals reviewed.      ED Course     ED Course     Procedures               Results for orders placed or performed during the hospital encounter of 07/12/18 (from the past 24 hour(s))   CBC with platelets differential   Result Value Ref Range    WBC 10.0 4.0 - 11.0 10e9/L    RBC Count 4.12 (L) 4.4 - 5.9 10e12/L    Hemoglobin 12.5 (L) 13.3 - 17.7 g/dL    Hematocrit 36.8 (L) 40.0 - 53.0 %    MCV 89 78 - 100 fl    MCH 30.3 26.5 - 33.0 pg    MCHC 34.0 31.5 - 36.5 g/dL    RDW 13.8 10.0 - 15.0 %    Platelet Count 228 150 - 450 10e9/L    Diff Method Automated Method     % Neutrophils 75.5 %    % Lymphocytes 12.8 %    %  Monocytes 9.3 %    % Eosinophils 1.4 %    % Basophils 0.4 %    % Immature Granulocytes 0.6 %    Nucleated RBCs 0 0 /100    Absolute Neutrophil 7.5 1.6 - 8.3 10e9/L    Absolute Lymphocytes 1.3 0.8 - 5.3 10e9/L    Absolute Monocytes 0.9 0.0 - 1.3 10e9/L    Absolute Eosinophils 0.1 0.0 - 0.7 10e9/L    Absolute Basophils 0.0 0.0 - 0.2 10e9/L    Abs Immature Granulocytes 0.1 0 - 0.4 10e9/L    Absolute Nucleated RBC 0.0    Comprehensive metabolic panel   Result Value Ref Range    Sodium 141 133 - 144 mmol/L    Potassium 4.1 3.4 - 5.3 mmol/L    Chloride 104 94 - 109 mmol/L    Carbon Dioxide 23 20 - 32 mmol/L    Anion Gap 14 3 - 14 mmol/L    Glucose 155 (H) 70 - 99 mg/dL    Urea Nitrogen 23 7 - 30 mg/dL    Creatinine 1.34 (H) 0.66 - 1.25 mg/dL    GFR Estimate 53 (L) >60 mL/min/1.7m2    GFR Estimate If Black 64 >60 mL/min/1.7m2    Calcium 9.5 8.5 - 10.1 mg/dL    Bilirubin Total 0.5 0.2 - 1.3 mg/dL    Albumin 4.1 3.4 - 5.0 g/dL    Protein Total 7.5 6.8 - 8.8 g/dL    Alkaline Phosphatase 83 40 - 150 U/L    ALT 48 0 - 70 U/L    AST 32 0 - 45 U/L   XR Pelvis and Hip Left 2 Views    Narrative    PROCEDURE: XR PELVIS AND HIP LEFT 2 VIEWS 7/12/2018 6:04 PM    HISTORY: fall, left groin pain;     COMPARISONS: None.    TECHNIQUE: Pelvis one view, left hip 2 views    FINDINGS: Pelvis: The pelvis is intact. The sacrum and sacroiliac  joints appear normal. The acetabulum and both proximal femurs appear  normal.    Left hip 2 views: The articular spaces normal height at the left hip.  The acetabulum and proximal femur appears normal.         Impression    IMPRESSION: Normal 2 view examination of the left hip    MELLY MICHEL MD   CT Pelvis w/o Contrast    Narrative    PROCEDURE: CT PELVIS W/O CONTRAST 7/12/2018 6:50 PM    HISTORY: left groin pain, trauma;     COMPARISONS: None.    Meds/Dose Given:    TECHNIQUE: CT scan of pelvis with sagittal coronal reconstructions    FINDINGS: Degenerative changes are seen in the sacroiliac joints  right  worse than left. No fractures of the sacrum are seen. The pelvis is  intact. Both proximal femurs appear normal. Articular spaces are  normal height of both hips. Within the pelvis the bladder and rectum  appear normal nose abnormalities are seen in the muscles of the pelvic  girdle.         Impression    IMPRESSION: Negative CT scan of the pelvis    MELLY MICHEL MD       Medications   morphine (PF) injection 4 mg (4 mg Intravenous Given 7/12/18 1735)   ondansetron (ZOFRAN) injection 4 mg (4 mg Intravenous Given 7/12/18 1733)   morphine (PF) injection 4 mg (4 mg Intravenous Given 7/12/18 1836)       Assessments & Plan (with Medical Decision Making)   Sree presents with left groin pain following a ground level fall. He was given MS 4mg IV x 2 for pain. Zofran 4mg given for nausea prophylaxis. Left hip and pelvis XR is negative for acute fracture. Due to degree of pain, CT pelvis was ordered which was also normal. He is able to walk to the bathroom w/o difficulties following. Findings consistent with left hip strain. RX for Norco was given. He was discharged home with his wife following.     Plan: Take the Norco as prescribed for pain. Alternate between heat and ice. Follow up with primary care on Monday for re-check. Return here sooner with any new or worsening symptoms.     I have reviewed the nursing notes.    I have reviewed the findings, diagnosis, plan and need for follow up with the patient.    Discharge Medication List as of 7/12/2018  7:46 PM      START taking these medications    Details   HYDROcodone-acetaminophen (NORCO) 5-325 MG per tablet Take 1 tablet by mouth every 4 hours as needed for pain, Disp-18 tablet, R-0, Local Print             Final diagnoses:   Strain of left inguinal muscle, initial encounter       7/12/2018   [facility]     Christina [place] PA-C  07/12/18 2040

## 2018-08-01 NOTE — ED PROVIDER NOTES
Trauma Evaluation    Sree is being evaluated for pain in his left groin after loading a tractor in a truck.  The tractor started to come back at him and he jumped back, caught is foot, and landed on his back.  Patient states he cannot  his left leg, but has been able to bear weight on it since the accident.  He did hit the back of his head, but has no neck pain, had no loss of consciousness.  He does complain of dizziness.  C-collar is not in place related to being unnecessary..           Full skeletal survey performed, no injuries noted.  Muscular / tendon pain in the left groin as above.         Chest, abdomen and pelvis are benign.  PERRLA, EOMI, no raccoon eyes, no garber sign, no hemotympanum or septal hematoma.    Care of the patient turned over to NICO Bueno after initial assessment completed.    MD Star Uribe Deborah L, MD  08/01/18 [id number]

## 2019-02-06 ENCOUNTER — HOSPITAL ENCOUNTER (OUTPATIENT)
Dept: MRI IMAGING | Facility: HOSPITAL | Age: 69
Discharge: HOME | End: 2019-02-06
Attending: OTOLARYNGOLOGY | Admitting: OTOLARYNGOLOGY
Payer: MEDICARE

## 2019-02-06 DIAGNOSIS — R42 DISEQUILIBRIUM: ICD-10-CM

## 2019-02-06 DIAGNOSIS — R43.0 ANOSMIA: ICD-10-CM

## 2019-02-06 LAB
CREAT BLD-MCNC: 1 MG/DL (ref 0.66–1.25)
GFR SERPL CREATININE-BSD FRML MDRD: 74 ML/MIN/{1.73_M2}

## 2019-02-06 PROCEDURE — 82565 ASSAY OF CREATININE: CPT

## 2019-02-06 PROCEDURE — A9585 GADOBUTROL INJECTION: HCPCS | Performed by: RADIOLOGY

## 2019-02-06 PROCEDURE — 70553 MRI BRAIN STEM W/O & W/DYE: CPT | Mod: TC

## 2019-02-06 PROCEDURE — 25500064 ZZH RX 255 OP 636: Performed by: RADIOLOGY

## 2019-02-06 RX ORDER — GADOBUTROL 604.72 MG/ML
7.5 INJECTION INTRAVENOUS ONCE
Status: COMPLETED | OUTPATIENT
Start: 2019-02-06 | End: 2019-02-06

## 2019-02-06 RX ORDER — GADOBUTROL 604.72 MG/ML
2 INJECTION INTRAVENOUS ONCE
Status: COMPLETED | OUTPATIENT
Start: 2019-02-06 | End: 2019-02-06

## 2019-02-06 RX ADMIN — GADOBUTROL 7.5 ML: 604.72 INJECTION INTRAVENOUS at 07:47

## 2019-02-06 RX ADMIN — GADOBUTROL 2 ML: 604.72 INJECTION INTRAVENOUS at 07:46

## 2019-02-25 ENCOUNTER — HOSPITAL ENCOUNTER (EMERGENCY)
Facility: HOSPITAL | Age: 69
Discharge: HOME | End: 2019-02-25
Attending: NURSE PRACTITIONER | Admitting: NURSE PRACTITIONER
Payer: MEDICARE

## 2019-02-25 VITALS
OXYGEN SATURATION: 97 % | DIASTOLIC BLOOD PRESSURE: 79 MMHG | SYSTOLIC BLOOD PRESSURE: 136 MMHG | RESPIRATION RATE: 16 BRPM | TEMPERATURE: 98.1 F

## 2019-02-25 DIAGNOSIS — J02.9 ACUTE SORE THROAT: ICD-10-CM

## 2019-02-25 DIAGNOSIS — J20.9 ACUTE BRONCHITIS, UNSPECIFIED ORGANISM: ICD-10-CM

## 2019-02-25 LAB
DEPRECATED S PYO AG THROAT QL EIA: NORMAL
SPECIMEN SOURCE: NORMAL

## 2019-02-25 PROCEDURE — G0463 HOSPITAL OUTPT CLINIC VISIT: HCPCS

## 2019-02-25 PROCEDURE — 99213 OFFICE O/P EST LOW 20 MIN: CPT | Performed by: NURSE PRACTITIONER

## 2019-02-25 PROCEDURE — 87880 STREP A ASSAY W/OPTIC: CPT | Performed by: FAMILY MEDICINE

## 2019-02-25 PROCEDURE — 87081 CULTURE SCREEN ONLY: CPT | Performed by: FAMILY MEDICINE

## 2019-02-25 RX ORDER — DOXYCYCLINE 100 MG/1
100 CAPSULE ORAL 2 TIMES DAILY
Qty: 20 CAPSULE | Refills: 0 | Status: SHIPPED | OUTPATIENT
Start: 2019-02-25 | End: 2019-03-07

## 2019-02-25 RX ORDER — CODEINE PHOSPHATE AND GUAIFENESIN 10; 100 MG/5ML; MG/5ML
1-2 SOLUTION ORAL EVERY 4 HOURS PRN
Qty: 118 ML | Refills: 0 | Status: SHIPPED | OUTPATIENT
Start: 2019-02-25 | End: 2019-03-27

## 2019-02-25 RX ORDER — EXENATIDE 2 MG/.65ML
INJECTION, SUSPENSION, EXTENDED RELEASE SUBCUTANEOUS
Refills: 2 | COMMUNITY
Start: 2019-01-18

## 2019-02-25 RX ORDER — ALLOPURINOL 100 MG/1
TABLET ORAL
Refills: 0 | COMMUNITY
Start: 2019-01-23

## 2019-02-25 ASSESSMENT — ENCOUNTER SYMPTOMS
FEVER: 0
PSYCHIATRIC NEGATIVE: 1
ACTIVITY CHANGE: 0
WHEEZING: 1
DYSURIA: 0
NECK PAIN: 0
NAUSEA: 0
VOICE CHANGE: 0
STRIDOR: 0
SINUS PAIN: 0
TROUBLE SWALLOWING: 0
NECK STIFFNESS: 0
ABDOMINAL PAIN: 0
VOMITING: 0
SHORTNESS OF BREATH: 1
APPETITE CHANGE: 0
SORE THROAT: 1
DIARRHEA: 0
SINUS PRESSURE: 0
RHINORRHEA: 0
COUGH: 1
CHILLS: 0
WEAKNESS: 0

## 2019-02-25 NOTE — DISCHARGE INSTRUCTIONS
Take antibiotics as ordered.   Eat a yogurt daily while taking antibiotics.   Take Robitussin with Codeine as needed as directed. Do not drive or participate in activities that require alertness.   Increase water intake.   Rest.   Follow up with PCP with any increase in symptoms or concerns.   Return to urgent care or emergency department with any increase in symptoms or concerns.

## 2019-02-25 NOTE — ED PROVIDER NOTES
"  History     Chief Complaint   Patient presents with     Pharyngitis     c/o sore throat and cough     The history is provided by the patient. No  was used.     Sree Chris is a 68 year old male who presents with a sore throat and cough. He's taken Raisa [place], Theraflu, and \"green capsules\" that are OTC with mild effectiveness. Denies fever, chills, or night sweats. Eating and drinking well. Bowel and bladder are working well. No antibiotic use in the past 30 days. He is diabetic. His blood glucose has been elevated at 170-190. He received an influenza vaccine this flu season.       Allergies:  Allergies   Allergen Reactions     Biaxin [Clarithromycin-Fd&C Yellow #10] Itching     Hydromorphone Nausea and Vomiting     No Clinical Screening - See Comments      Dilaudid -  vomiting       Problem List:    There are no active problems to display for this patient.       Past Medical History:    Past Medical History:   Diagnosis Date     Diabetes (H)      High cholesterol      Hypertension        Past Surgical History:    No past surgical history on file.    Family History:    No family history on file.    Social History:  Marital Status:   [2]  Social History     Tobacco Use     Smoking status: Never Smoker   Substance Use Topics     Alcohol use: No     Drug use: No        Medications:      amLODIPine (NORVASC) 5 MG tablet   ASPIRIN PO   ATORVASTATIN CALCIUM PO   Cholecalciferol (VITAMIN D3 PO)   doxycycline hyclate (VIBRAMYCIN) 100 MG capsule   ESCITALOPRAM OXALATE PO   guaiFENesin-codeine (ROBITUSSIN AC) 100-10 MG/5ML solution   LISINOPRIL PO   METFORMIN HCL PO   Multiple Vitamins-Minerals (CENTRUM SILVER) per tablet   Omeprazole (PRILOSEC PO)   allopurinol (ZYLOPRIM) 100 MG tablet   BYDUREON 2 MG pen   sulfaSALAzine (AZULFIDINE) 500 MG tablet         Review of Systems   Constitutional: Negative for activity change, appetite change, chills and fever.   HENT: Positive for congestion " and sore throat. Negative for ear pain, postnasal drip, rhinorrhea, sinus pressure, sinus pain, trouble swallowing and voice change.    Respiratory: Positive for cough, shortness of breath and wheezing. Negative for stridor.         SOB with activity.    Cardiovascular: Negative for chest pain.   Gastrointestinal: Negative for abdominal pain, diarrhea, nausea and vomiting.   Genitourinary: Negative for dysuria.   Musculoskeletal: Negative for neck pain and neck stiffness.   Skin: Negative for rash.   Neurological: Negative for weakness.   Psychiatric/Behavioral: Negative.        Physical Exam   BP: 136/79  Heart Rate: 105  Temp: 98.1  F (36.7  C)  Resp: 16  SpO2: 97 %      Physical Exam   Constitutional: He is oriented to person, place, and time. He appears well-developed and well-nourished. No distress.   HENT:   Head: Normocephalic.   Right Ear: External ear normal.   Left Ear: External ear normal.   Mouth/Throat: Oropharynx is clear and moist. No oropharyngeal exudate.   Posterior oropharynx with erythema without exudate. Uvula midline with slight erythema.    Neck: Normal range of motion. Neck supple.   Cardiovascular: Normal rate, regular rhythm and normal heart sounds.   No murmur heard.  Pulmonary/Chest: Effort normal. No stridor. No respiratory distress. He has no wheezes. He has rales.   Abdominal: Soft. He exhibits no distension.   Musculoskeletal: Normal range of motion.   Lymphadenopathy:     He has no cervical adenopathy.   Neurological: He is alert and oriented to person, place, and time. He exhibits normal muscle tone.   Skin: Skin is warm and dry. Capillary refill takes less than 2 seconds. No rash noted. He is not diaphoretic.   Psychiatric: He has a normal mood and affect. His behavior is normal.   Nursing note and vitals reviewed.      ED Course     Procedures      Results for orders placed or performed during the hospital encounter of 02/25/19 (from the past 24 hour(s))   Rapid strep screen    Result Value Ref Range    Specimen Description Throat     Rapid Strep A Screen       NEGATIVE: No Group A streptococcal antigen detected by immunoassay, await culture report.     Rapid strep done prior to me seeing him.     Assessments & Plan (with Medical Decision Making)     Discussed plan of care. He verbalized understanding. All questions answered.     I have reviewed the nursing notes.    I have reviewed the findings, diagnosis, plan and need for follow up with the patient.  Discharged in stable condition.        Medication List      Started    doxycycline hyclate 100 MG capsule  Commonly known as:  VIBRAMYCIN  100 mg, Oral, 2 TIMES DAILY     guaiFENesin-codeine 100-10 MG/5ML solution  Commonly known as:  ROBITUSSIN AC  1-2 tsp., Oral, EVERY 4 HOURS PRN            Final diagnoses:   Acute bronchitis, unspecified organism   Acute sore throat     Take antibiotics as ordered.   Eat a yogurt daily while taking antibiotics.   Take Robitussin with Codeine as needed as directed. Do not drive or participate in activities that require alertness.   Increase water intake.   Rest.   Follow up with PCP with any increase in symptoms or concerns.   Return to urgent care or emergency department with any increase in symptoms or concerns.     FEROZ Graham  2/25/2019  11:23 AM  [facility]       Mandy Gilbert NP  02/25/19 3626

## 2019-02-25 NOTE — ED AVS SNAPSHOT
[facility]  [address]  [place] [address]  Phone:  [phone]                                    Sree Chris   MRN: [id number]    Department:  [facility]   Date of Visit:  2/25/2019           After Visit Summary Signature Page    I have received my discharge instructions, and my questions have been answered. I have discussed any challenges I see with this plan with the nurse or doctor.    ..........................................................................................................................................  Patient/Patient Representative Signature      ..........................................................................................................................................  Patient Representative Print Name and Relationship to Patient    ..................................................               ................................................  Date                                   Time    ..........................................................................................................................................  Reviewed by Signature/Title    ...................................................              ..............................................  Date                                               Time          22EPIC Rev 08/18

## 2019-02-27 LAB
BACTERIA SPEC CULT: NORMAL
SPECIMEN SOURCE: NORMAL

## 2021-10-21 ENCOUNTER — HOSPITAL ENCOUNTER (EMERGENCY)
Facility: HOSPITAL | Age: 71
Discharge: HOME | End: 2021-10-21
Attending: NURSE PRACTITIONER | Admitting: NURSE PRACTITIONER
Payer: MEDICARE

## 2021-10-21 VITALS
TEMPERATURE: 99.1 F | SYSTOLIC BLOOD PRESSURE: 156 MMHG | HEART RATE: 92 BPM | OXYGEN SATURATION: 94 % | DIASTOLIC BLOOD PRESSURE: 84 MMHG | RESPIRATION RATE: 16 BRPM

## 2021-10-21 DIAGNOSIS — R50.9 ACUTE FEBRILE ILLNESS: Primary | ICD-10-CM

## 2021-10-21 DIAGNOSIS — Z20.822 SUSPECTED COVID-19 VIRUS INFECTION: ICD-10-CM

## 2021-10-21 PROCEDURE — 99213 OFFICE O/P EST LOW 20 MIN: CPT | Performed by: NURSE PRACTITIONER

## 2021-10-21 PROCEDURE — C9803 HOPD COVID-19 SPEC COLLECT: HCPCS

## 2021-10-21 PROCEDURE — 87637 SARSCOV2&INF A&B&RSV AMP PRB: CPT | Performed by: NURSE PRACTITIONER

## 2021-10-21 PROCEDURE — G0463 HOSPITAL OUTPT CLINIC VISIT: HCPCS

## 2021-10-21 ASSESSMENT — ENCOUNTER SYMPTOMS
COUGH: 0
SORE THROAT: 0
RHINORRHEA: 0
DYSURIA: 0
NAUSEA: 1
DIZZINESS: 0
MYALGIAS: 1
CHILLS: 0
APPETITE CHANGE: 0
VOMITING: 0
WOUND: 0
FEVER: 1
HEADACHES: 1
HEMATURIA: 0
SHORTNESS OF BREATH: 0
FREQUENCY: 0
ABDOMINAL PAIN: 1
DIARRHEA: 0

## 2021-10-22 LAB
FLUAV RNA SPEC QL NAA+PROBE: NEGATIVE
FLUBV RNA RESP QL NAA+PROBE: NEGATIVE
RSV RNA SPEC NAA+PROBE: NEGATIVE
SARS-COV-2 RNA RESP QL NAA+PROBE: NEGATIVE

## 2021-10-22 NOTE — ED TRIAGE NOTES
"Pt presents to ED with c/o fever and \"Achiness\" onset last night. Denies n/v/d, SOB, CP.  Pt reports HA.   "

## 2021-10-22 NOTE — ED PROVIDER NOTES
History     Chief Complaint   Patient presents with     Fever     Headache     HPI  Sree Chris is a 71 year old male who presents to urgent care with spouse for evaluation of not feeling well.  Patient reports fever of up to 101  F, body aches, headache, fatigue, mild abdominal pain and nausea.  Symptoms started yesterday.  He denies chest pain, shortness of breath, cough, vomiting or diarrhea.  No known recent ill contacts.  No known tick bites.  No rash to his body.  He is still eating and drinking well.  He has had a COVID-19 vaccine.  Patient has been taking ibuprofen.     Allergies:  Allergies   Allergen Reactions     Biaxin [Clarithromycin-Fd&C Yellow #10] Itching     Hydromorphone Nausea and Vomiting     Other [No Clinical Screening - See Comments]      Dilaudid -  vomiting       Problem List:    There are no problems to display for this patient.       Past Medical History:    Past Medical History:   Diagnosis Date     Diabetes (H)      High cholesterol      Hypertension        Past Surgical History:    No past surgical history on file.    Family History:    No family history on file.    Social History:  Marital Status:   [2]  Social History     Tobacco Use     Smoking status: Never Smoker   Substance Use Topics     Alcohol use: No     Drug use: No        Medications:    allopurinol (ZYLOPRIM) 100 MG tablet  amLODIPine (NORVASC) 5 MG tablet  ASPIRIN PO  ATORVASTATIN CALCIUM PO  BYDUREON 2 MG pen  Cholecalciferol (VITAMIN D3 PO)  ESCITALOPRAM OXALATE PO  LISINOPRIL PO  METFORMIN HCL PO  Multiple Vitamins-Minerals (CENTRUM SILVER) per tablet  Omeprazole (PRILOSEC PO)  sulfaSALAzine (AZULFIDINE) 500 MG tablet          Review of Systems   Constitutional: Positive for fever. Negative for appetite change and chills.   HENT: Negative for congestion, rhinorrhea and sore throat.    Respiratory: Negative for cough and shortness of breath.    Cardiovascular: Negative for chest pain.   Gastrointestinal:  Positive for abdominal pain and nausea (mild). Negative for diarrhea and vomiting.   Genitourinary: Negative for dysuria, frequency, hematuria and urgency.   Musculoskeletal: Positive for myalgias.   Skin: Negative for rash and wound.   Neurological: Positive for headaches. Negative for dizziness.   All other systems reviewed and are negative.      Physical Exam   BP: 156/84  Pulse: 92  Temp: 99.1  F (37.3  C)  Resp: 16  SpO2: 94 %      Physical Exam  Vitals and nursing note reviewed.   Constitutional:       Appearance: Normal appearance. He is not ill-appearing or toxic-appearing.   HENT:      Head: Normocephalic.      Right Ear: Tympanic membrane and ear canal normal.      Left Ear: Tympanic membrane and ear canal normal.      Nose: Nose normal. No congestion or rhinorrhea.      Mouth/Throat:      Mouth: Mucous membranes are moist.   Eyes:      Pupils: Pupils are equal, round, and reactive to light.   Cardiovascular:      Rate and Rhythm: Normal rate and regular rhythm.      Heart sounds: Normal heart sounds.   Pulmonary:      Effort: Pulmonary effort is normal.      Breath sounds: Normal breath sounds.   Abdominal:      General: Bowel sounds are normal. There is no distension.      Palpations: Abdomen is soft.      Tenderness: There is no abdominal tenderness. There is no right CVA tenderness, left CVA tenderness, guarding or rebound.   Musculoskeletal:         General: Normal range of motion.      Cervical back: Neck supple.   Lymphadenopathy:      Cervical: No cervical adenopathy.   Skin:     General: Skin is warm and dry.      Capillary Refill: Capillary refill takes less than 2 seconds.      Coloration: Skin is not pale.      Findings: No bruising, erythema or rash.   Neurological:      Mental Status: He is alert and oriented to person, place, and time.         ED Course        Procedures              No results found for this or any previous visit (from the past 24 hour(s)).    Medications - No data to  display    Assessments & Plan (with Medical Decision Making)     I have reviewed the nursing notes.    71-year-old male that presented for evaluation of febrile illness.  Heart rate and rhythm regular.  Respirations are nonlabored.  Soft and nontender abdomen on palpation.  Patient is alert and answering questions appropriately.  He notes he is drinking fluids well.  No known tick bites.    COVID-19 and influenza test done with results pending at discharge.  Patient will be notified of results when available.    I recommended continuing with ibuprofen or Tylenol as needed for pain or fever.  Push fluids.  Self isolate at home.  Follow-up with PCP as needed if no improvement in symptoms.  Return to ED/UC for worsening or concerning symptoms.  Patient voiced understanding.    I have reviewed the findings, diagnosis, plan and need for follow up with the patient.  This document was prepared using a combination of typing and voice generated software.  While every attempt was made for accuracy, spelling and grammatical errors may exist.    New Prescriptions    No medications on file       Final diagnoses:   Acute febrile illness   Suspected COVID-19 virus infection       10/21/2021   [facility]     Mpofu, Prudence, CNP  10/23/21 1531

## 2021-10-22 NOTE — ED TRIAGE NOTES
Pt presents with c/o fever (at home 101), muscle aches, headache, fatigue. Denies SOB or chest pain. Sx started yesterday afternoon. Pt is covid vaccinated. Pt states he has not been exposed to covid that he knows of. Pt has taken ibuprofen.

## 2021-10-22 NOTE — DISCHARGE INSTRUCTIONS
Continue taking Tylenol or  ibuprofen as needed for fever or pain    Drink plenty of fluids including gatorade or powerade.    We will notify you of your results when available.    Return to emergency department for worsening or concerning symptoms.

## 2021-11-06 ENCOUNTER — HEALTH MAINTENANCE LETTER (OUTPATIENT)
Age: 71
End: 2021-11-06

## 2022-10-29 ENCOUNTER — HEALTH MAINTENANCE LETTER (OUTPATIENT)
Age: 72
End: 2022-10-29

## 2022-12-11 ENCOUNTER — HEALTH MAINTENANCE LETTER (OUTPATIENT)
Age: 72
End: 2022-12-11

## 2023-02-01 ENCOUNTER — DOCUMENTATION ONLY (OUTPATIENT)
Dept: SLEEP MEDICINE | Facility: HOSPITAL | Age: 73
End: 2023-02-01

## 2023-02-01 NOTE — PROGRESS NOTES
STOP FE       Name: Sree Chris MRN# [id number]   Age: 72 year old YOB: 1950     Stop Bang questionnaire completed with a score of >3 to allow for HST     Have you been told you snore loudly (louder than talking or loud enough to be heard through doors)? YES    Do you often feel tired, fatigued, or sleepy during the daytime? YES    Has anyone observed you stop breathing during your sleep? YES    Do you have or are you being treated for high blood pressure? YES    Is your BMI greater than 35? NO 32.9    Is your neck size circumference 16 inches or greater? YES    Are you over 50 years old? YES    Stop Bang Score (# of yes): 7

## 2023-02-01 NOTE — PROGRESS NOTES
Sree is a 72 year old who is being evaluated via a billable telephone visit.      What phone number would you like to be contacted at? [phone]  How would you like to obtain your AVS? Mail a copy    Distant Location (provider location):  Off-site  Phone call duration: 20 minutes  Kandice Rivera 2/2/2023    Sree Chris is a 72 year old male who is being evaluated via a billable telephone visit.       What phone number would you like to be contacted at?@ [phone]  Home Phone [phone]   Work Phone Not on file.   Mobile [phone]       How would you like to obtain your AVS? [employer]       Telephone Virtual Visit Details     Type of service:  Telephone Virtual Visit     Originating Location (pt. Location): Home     Distant Location (provider location):  Off-site, [facility] [place]      Start Time: 10am  End Time: 1015    Virtual visit for establishing care for history of mild obstructive sleep apnea managed with CPAP.     Assessment / Plan:    1.)  Mild KRUNAL  - Appearing well controlled per patient and on PAP download ~2 years ago on CPAP auto-titrate 5-10 cm H2O.  -While I did not have a up-to-date CPAP download for review today, given the history of mild apnea and previously well controlled, I did not have concerns with placing order for supplies and replacement equipment.  - I also feel that it is medically necessary for replacement CPAP due to his machine displaying the end of motor life warning.  - Placed orders for replacement CPAP auto titrate 5-10 cm H2O.  He would like to use [facility] for his supplies and new machine.      SUBJECTIVE:  Sree Chris is a 72 year old male.    Pertinent PMHx of KRUNAL, DM II with peripheral neuropathy, HTN.    Last visit with sleep medicine with Elizabeth Beavers CNP with [facility] on 5/16/2022.  Noted for PSG in 2015 with AHI 9.3, kosta SpO2 85%.  Presenting for yearly follow-up on CPAP.  Uses [facility].  Appearing to be doing well  with CPAP auto-titrate 5-10 cm H2O.    Today -we reviewed his interim history.  He feels that he is sleeping well with his CPAP and has no concerns.  He presents for follow-up here with us due to recommendation from [place] [facility] given that he needs an updated supply prescription and also recommendation for CPAP replacement due to machine displaying end of motor life warning.    Otherwise he denies any snoring or observed apnea.  He wears a CPAP on a nightly basis.  Most nights he goes to bed 11 PM will have 2-3 awakenings to use the bathroom, will awaken at 8 AM.  He feels well rested.  He may take a 30 to 60-minute nap once a day.    He denies any sleepwalking or other abnormal nocturnal behaviors, denies ruminating behavior.      Past medical history:    There are no problems to display for this patient.      10 point ROS of systems including Constitutional, Eyes, Respiratory, Cardiovascular, Gastroenterology, Genitourinary, Integumentary, Muscularskeletal, Psychiatric were all negative except for pertinent positives noted in my HPI.    Current Outpatient Medications   Medication Sig Dispense Refill     allopurinol (ZYLOPRIM) 100 MG tablet TK 2 TS PO QD  0     amLODIPine (NORVASC) 5 MG tablet Take 5 mg by mouth daily       ASPIRIN PO Take 81 mg by mouth daily       ATORVASTATIN CALCIUM PO Take 40 mg by mouth daily       BYDUREON 2 MG pen INJECT 1 PEN UNDER THE SKIN Q 7 DAYS  2     Cholecalciferol (VITAMIN D3 PO) Take 1,000 Units by mouth daily       ESCITALOPRAM OXALATE PO Take 10 mg by mouth At Bedtime       LISINOPRIL PO Take 40 mg by mouth daily.       METFORMIN HCL PO Take 1,000 mg by mouth 2 times daily (with meals).       Multiple Vitamins-Minerals (CENTRUM SILVER) per tablet Take 1 tablet by mouth daily       Omeprazole (PRILOSEC PO) Take by mouth every morning        sulfaSALAzine (AZULFIDINE) 500 MG tablet Take 500 mg by mouth 2 times daily         OBJECTIVE:  There were no vitals taken for this  visit.    Physical Exam:  healthy, alert and no distress  PSYCH: Alert and oriented times 3; coherent speech, normal   rate and volume, able to articulate logical thoughts, able   to abstract reason, no tangential thoughts, no hallucinations   or delusions  His affect is normal  RESP: No cough, no audible wheezing, able to talk in full sentences  Remainder of exam unable to be completed due to telephone visits    ---  This note was written with the assistance of the Dragon voice-dictation technology software. The final document, although reviewed, may contain errors. For corrections, please contact the office.    Total time spent preparing to see the patient, review of chart, obtaining history and physical examination, review of sleep testing, review of treatment options, education, discussion with patient and documenting in Epic / EMR was 20 minutes.  All time involved was spent on the day of service for the patient (the same day as the patient's appointment).    Gil Peterson MD    Sleep Medicine    [place], [place]  o Main Office: [phone]    [facility] [place], [place]  o [address], [address]  o Schedule visits: [phone]  o Main Office: [phone]  o Fax: [phone]
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
## 2023-02-02 ENCOUNTER — VIRTUAL VISIT (OUTPATIENT)
Dept: PULMONOLOGY | Facility: OTHER | Age: 73
End: 2023-02-02
Attending: FAMILY MEDICINE
Payer: COMMERCIAL

## 2023-02-02 VITALS — HEIGHT: 65 IN | BODY MASS INDEX: 32.99 KG/M2 | WEIGHT: 198 LBS

## 2023-02-02 DIAGNOSIS — G47.33 OSA (OBSTRUCTIVE SLEEP APNEA): Primary | ICD-10-CM

## 2023-02-02 DIAGNOSIS — I10 PRIMARY HYPERTENSION: ICD-10-CM

## 2023-02-02 PROCEDURE — 99443 PR PHYSICIAN TELEPHONE EVALUATION 21-30 MIN: CPT | Mod: 95 | Performed by: FAMILY MEDICINE

## 2023-02-02 RX ORDER — SEMAGLUTIDE 2.68 MG/ML
INJECTION, SOLUTION SUBCUTANEOUS
COMMUNITY
Start: 2022-11-28

## 2023-02-02 ASSESSMENT — SLEEP AND FATIGUE QUESTIONNAIRES
HOW LIKELY ARE YOU TO NOD OFF OR FALL ASLEEP WHILE SITTING AND TALKING TO SOMEONE: WOULD NEVER DOZE
HOW LIKELY ARE YOU TO NOD OFF OR FALL ASLEEP WHILE WATCHING TV: SLIGHT CHANCE OF DOZING
HOW LIKELY ARE YOU TO NOD OFF OR FALL ASLEEP WHILE SITTING AND READING: SLIGHT CHANCE OF DOZING
HOW LIKELY ARE YOU TO NOD OFF OR FALL ASLEEP WHILE SITTING INACTIVE IN A PUBLIC PLACE: WOULD NEVER DOZE
HOW LIKELY ARE YOU TO NOD OFF OR FALL ASLEEP WHEN YOU ARE A PASSENGER IN A CAR FOR AN HOUR WITHOUT A BREAK: SLIGHT CHANCE OF DOZING
HOW LIKELY ARE YOU TO NOD OFF OR FALL ASLEEP WHILE SITTING QUIETLY AFTER LUNCH WITHOUT ALCOHOL: SLIGHT CHANCE OF DOZING
HOW LIKELY ARE YOU TO NOD OFF OR FALL ASLEEP WHILE LYING DOWN TO REST IN THE AFTERNOON WHEN CIRCUMSTANCES PERMIT: MODERATE CHANCE OF DOZING
HOW LIKELY ARE YOU TO NOD OFF OR FALL ASLEEP IN A CAR, WHILE STOPPED FOR A FEW MINUTES IN TRAFFIC: SLIGHT CHANCE OF DOZING

## 2024-01-21 ENCOUNTER — HEALTH MAINTENANCE LETTER (OUTPATIENT)
Age: 74
End: 2024-01-21

## 2024-02-18 ENCOUNTER — HOSPITAL ENCOUNTER (EMERGENCY)
Facility: HOSPITAL | Age: 74
Discharge: HOME | End: 2024-02-18
Attending: NURSE PRACTITIONER | Admitting: NURSE PRACTITIONER
Payer: COMMERCIAL

## 2024-02-18 VITALS
TEMPERATURE: 101.6 F | SYSTOLIC BLOOD PRESSURE: 136 MMHG | RESPIRATION RATE: 24 BRPM | DIASTOLIC BLOOD PRESSURE: 86 MMHG | HEART RATE: 115 BPM | OXYGEN SATURATION: 94 %

## 2024-02-18 DIAGNOSIS — U07.1 COVID-19 VIRUS INFECTION: ICD-10-CM

## 2024-02-18 PROBLEM — M05.79 RHEUMATOID ARTHRITIS WITH RHEUMATOID FACTOR OF MULTIPLE SITES WITHOUT ORGAN OR SYSTEMS INVOLVEMENT (H): Status: ACTIVE | Noted: 2022-12-13

## 2024-02-18 PROBLEM — N18.31 STAGE 3A CHRONIC KIDNEY DISEASE (H): Status: ACTIVE | Noted: 2023-03-02

## 2024-02-18 PROBLEM — F32.0 MILD MAJOR DEPRESSION, SINGLE EPISODE (H): Status: ACTIVE | Noted: 2017-12-13

## 2024-02-18 PROBLEM — M1A.09X0 CHRONIC GOUT OF MULTIPLE SITES, UNSPECIFIED CAUSE: Chronic | Status: ACTIVE | Noted: 2018-07-30

## 2024-02-18 PROBLEM — E66.9 OBESITY (BMI 30-39.9): Status: ACTIVE | Noted: 2020-03-10

## 2024-02-18 LAB
FLUAV RNA SPEC QL NAA+PROBE: NEGATIVE
FLUBV RNA RESP QL NAA+PROBE: NEGATIVE
RSV RNA SPEC NAA+PROBE: NEGATIVE
SARS-COV-2 RNA RESP QL NAA+PROBE: POSITIVE

## 2024-02-18 PROCEDURE — 93010 ELECTROCARDIOGRAM REPORT: CPT | Performed by: INTERNAL MEDICINE

## 2024-02-18 PROCEDURE — 99284 EMERGENCY DEPT VISIT MOD MDM: CPT | Performed by: NURSE PRACTITIONER

## 2024-02-18 PROCEDURE — 87637 SARSCOV2&INF A&B&RSV AMP PRB: CPT | Performed by: NURSE PRACTITIONER

## 2024-02-18 PROCEDURE — 99284 EMERGENCY DEPT VISIT MOD MDM: CPT

## 2024-02-18 PROCEDURE — 250N000013 HC RX MED GY IP 250 OP 250 PS 637: Performed by: NURSE PRACTITIONER

## 2024-02-18 PROCEDURE — 93005 ELECTROCARDIOGRAM TRACING: CPT

## 2024-02-18 RX ORDER — ACETAMINOPHEN 325 MG/1
975 TABLET ORAL ONCE
Status: COMPLETED | OUTPATIENT
Start: 2024-02-18 | End: 2024-02-18

## 2024-02-18 RX ORDER — IBUPROFEN 200 MG
400 TABLET ORAL ONCE
Status: COMPLETED | OUTPATIENT
Start: 2024-02-18 | End: 2024-02-18

## 2024-02-18 RX ADMIN — IBUPROFEN 400 MG: 200 TABLET, FILM COATED ORAL at 21:21

## 2024-02-18 RX ADMIN — ACETAMINOPHEN 975 MG: 325 TABLET, FILM COATED ORAL at 21:21

## 2024-02-18 ASSESSMENT — ENCOUNTER SYMPTOMS
GASTROINTESTINAL NEGATIVE: 1
SORE THROAT: 1
TROUBLE SWALLOWING: 0
ENDOCRINE NEGATIVE: 1
PSYCHIATRIC NEGATIVE: 1
MYALGIAS: 1
ALLERGIC/IMMUNOLOGIC NEGATIVE: 1
HEMATOLOGIC/LYMPHATIC NEGATIVE: 1
HEADACHES: 1
FATIGUE: 1
FEVER: 1
EYES NEGATIVE: 1
CARDIOVASCULAR NEGATIVE: 1
COUGH: 1

## 2024-02-18 ASSESSMENT — ACTIVITIES OF DAILY LIVING (ADL): ADLS_ACUITY_SCORE: 35

## 2024-02-19 LAB
ATRIAL RATE - MUSE: 121 BPM
DIASTOLIC BLOOD PRESSURE - MUSE: NORMAL MMHG
INTERPRETATION ECG - MUSE: NORMAL
P AXIS - MUSE: 34 DEGREES
PR INTERVAL - MUSE: 152 MS
QRS DURATION - MUSE: 76 MS
QT - MUSE: 310 MS
QTC - MUSE: 440 MS
R AXIS - MUSE: 56 DEGREES
SYSTOLIC BLOOD PRESSURE - MUSE: NORMAL MMHG
T AXIS - MUSE: 102 DEGREES
VENTRICULAR RATE- MUSE: 121 BPM

## 2024-02-19 NOTE — ED TRIAGE NOTES
"Patient presents with c/o \"I think I have Covid.\" Patient reports taking multiple home tests, all positive. Patient reports cough, congestion, SOB, and fevers. Last took tylenol this mid afternoon. Denies any N/V/D.       Patient mentions at the end of Triage that his chest has also been hurting X 3 days.         "

## 2024-02-19 NOTE — ED PROVIDER NOTES
History     Chief Complaint   Patient presents with    Covid Concern     HPI  Sree Chris is a 73 year old individual with history of chronic gout, coronary atherosclerosis, GERD, hypertension, major depression, KRUNAL, rheumatoid arthritis, stage III chronic kidney disease, DMT2, comes in for concerns of COVID.  Patient states that about 3 days ago he started to get cough, headache, sore throat, body aches, and fever.  Patient states he took COVID test at home and were positive.  Patient wants to confirm this so comes in.  No vomiting or diarrhea reported.  No chest pain or shortness of breath.    Allergies:  Allergies   Allergen Reactions    Biaxin [Clarithromycin] Itching    Hydromorphone Nausea and Vomiting    Other [No Clinical Screening - See Comments]      Dilaudid -  vomiting       Problem List:    Patient Active Problem List    Diagnosis Date Noted    Stage 3a chronic kidney disease (H) 03/02/2023     Priority: Medium    Rheumatoid arthritis with rheumatoid factor of multiple sites without organ or systems involvement (H) 12/13/2022     Priority: Medium    Obesity (BMI 30-39.9) 03/10/2020     Priority: Medium    Chronic gout of multiple sites, unspecified cause 07/30/2018     Priority: Medium     Last Assessment & Plan:    Formatting of this note might be different from the original.   No recent episodes      Mild major depression, single episode (H24) 12/13/2017     Priority: Medium     Last Assessment & Plan:    Formatting of this note might be different from the original.   Will stop duloxetine at pt's request.      KRUNAL (obstructive sleep apnea) 12/07/2015     Priority: Medium     Last Assessment & Plan:    Formatting of this note might be different from the original.   Doing well with CPAP--continue use.      Coronary atherosclerosis of native coronary artery 07/12/2011     Priority: Medium     Formatting of this note might be different from the original.   Cath in 2011, 20-30% RCA no intervention  needed.      Last Assessment & Plan:    Formatting of this note might be different from the original.   Stable. No changes.      Type 2 diabetes mellitus with diabetic neuropathy, without long-term current use of insulin (H) 02/08/2011     Priority: Medium     Formatting of this note might be different from the original.   10/10/2019[facility]: DM without diabetic retinopathy of both eyes/ recommend control of BP and BS, as well as regular diabetic follow          Last Assessment & Plan:    Formatting of this note might be different from the original.   Increase exercise--is going to PT and this should help.      Essential hypertension 11/23/2007     Priority: Medium     Formatting of this note might be different from the original.   IMO Update      Last Assessment & Plan:    Formatting of this note might be different from the original.   Stable.      Esophageal reflux 08/29/2007     Priority: Medium        Past Medical History:    Past Medical History:   Diagnosis Date    Diabetes (H)     High cholesterol     Hypertension        Past Surgical History:    History reviewed. No pertinent surgical history.    Family History:    History reviewed. No pertinent family history.    Social History:  Marital Status:   [2]  Social History     Tobacco Use    Smoking status: Never   Vaping Use    Vaping Use: Never used   Substance Use Topics    Alcohol use: No    Drug use: No        Medications:    allopurinol (ZYLOPRIM) 100 MG tablet  amLODIPine (NORVASC) 5 MG tablet  ASPIRIN PO  ATORVASTATIN CALCIUM PO  BYDUREON 2 MG pen  Cholecalciferol (VITAMIN D3 PO)  ESCITALOPRAM OXALATE PO  LISINOPRIL PO  METFORMIN HCL PO  Multiple Vitamins-Minerals (CENTRUM SILVER) per tablet  Omeprazole (PRILOSEC PO)  OZEMPIC, 2 MG/DOSE, 8 MG/3ML pen  sulfaSALAzine (AZULFIDINE) 500 MG tablet          Review of Systems   Constitutional:  Positive for fatigue and fever.   HENT:  Positive for sore throat. Negative for trouble swallowing.     Eyes: Negative.    Respiratory:  Positive for cough.    Cardiovascular: Negative.    Gastrointestinal: Negative.    Endocrine: Negative.    Genitourinary: Negative.    Musculoskeletal:  Positive for myalgias.   Skin: Negative.    Allergic/Immunologic: Negative.    Neurological:  Positive for headaches.   Hematological: Negative.    Psychiatric/Behavioral: Negative.         Physical Exam   BP: 130/87  Pulse: (!) 126  Temp: (!) 101.4  F (38.6  C)  Resp: 18  SpO2: 95 %      GENERAL APPEARANCE:  The patient is a 73 year old well-developed, well-nourished individual in no acute distress that appears as stated age.  LUNGS:  Breathing is easy.  Breath sounds are equal and clear bilaterally.  No wheezes, rhonchi, or rales.  HEART: Tachycardic rate with regular with normal S1 and S2.  No murmurs, gallops, or rubs.  NEUROLOGIC:  No focal sensory or motor deficits are noted.  PSYCHIATRIC:  The patient is awake, alert, and oriented x4.  Recent and remote memory is intact.  Appropriate mood and affect.  Calm and cooperative with history and physical exam.  SKIN:  Warm, dry, and well perfused.  Good turgor.  No lesions, nodules, or rashes are noted.  No bruising noted.      Comment: Discrepancies between my note and notes on behalf of the nursing team or other care providers are secondary to my findings reflecting my physical examination and questioning of the patient.  Any conflicting information provided is not in line with my examination of the patient.       ED Course              ED Course as of 02/18/24 2122   [place] Feb 18, 2024 2007 Labs ordered while in lobby.   2033 EKG 12-lead, tracing only  No STEMI noted.   2108 SARS CoV2 PCR(!): Positive  Patient positive for COVID.   2116 Patient defers any monoclonal antibody therapy.  For this reason we will discharge home to do acetaminophen and ibuprofen for fever and bodyaches.  Quarantine per COVID guidelines.  Return to ER if new or worsening symptoms, otherwise follow-up  with PCP as needed.  Patient in agreement.            ECG:    ECG competed at 2028 and personally reviewed at 2033 showing sinus tachycardia with ventricular rate of 121 and QTc of 440.  Normal axis.  Lateral ST abnormality is present.  Inferior T wave abnormality present.  Abnormal ECG.  No previous ECG available for comparison.         Results for orders placed or performed during the hospital encounter of 02/18/24 (from the past 24 hour(s))   Symptomatic Influenza A/B, RSV, & SARS-CoV2 PCR (COVID-19) Nasopharyngeal    Specimen: Nasopharyngeal; Swab   Result Value Ref Range    Influenza A PCR Negative Negative    Influenza B PCR Negative Negative    RSV PCR Negative Negative    SARS CoV2 PCR Positive (A) Negative    Narrative    Testing was performed using the Xpert Xpress CoV2/Flu/RSV Assay on the [employer] Instrument. This test should be ordered for the detection of SARS-CoV-2, influenza, and RSV viruses in individuals who meet clinical and/or epidemiological criteria. Test performance is unknown in asymptomatic patients. This test is for in vitro diagnostic use under the FDA EUA for laboratories certified under CLIA to perform high or moderate complexity testing. This test has not been FDA cleared or approved. A negative result does not rule out the presence of PCR inhibitors in the specimen or target RNA in concentration below the limit of detection for the assay. If only one viral target is positive but coinfection with multiple targets is suspected, the sample should be re-tested with another FDA cleared, approved, or authorized test, if coinfection would change clinical management. This test was validated by the [facility]. These laboratories are certified under the Clinical Laboratory Improvement Amendments of 1988 (CLIA-88) as qualified to perform high complexity laboratory testing.   EKG 12-lead, tracing only   Result Value Ref Range    Systolic Blood Pressure  mmHg     Diastolic Blood Pressure  mmHg    Ventricular Rate 121 BPM    Atrial Rate 121 BPM    TX Interval 152 ms    QRS Duration 76 ms     ms    QTc 440 ms    P Axis 34 degrees    R AXIS 56 degrees    T Axis 102 degrees    Interpretation ECG       Sinus tachycardia  Nonspecific ST and T wave abnormality  Abnormal ECG  No previous ECGs available         Medications   acetaminophen (TYLENOL) tablet 975 mg (975 mg Oral $Given 2/18/24 2121)   ibuprofen (ADVIL/MOTRIN) tablet 400 mg (400 mg Oral $Given 2/18/24 2121)       Assessments & Plan (with Medical Decision Making)     I have reviewed the nursing notes.    I have reviewed the findings, diagnosis, plan and need for follow up with the patient.    Summary:  Patient presents to the ER today for fever and COVID concerns.  Potential diagnosis which have been considered and evaluated include COVID, influenza, RSV, pneumonia, other viral illness, as well as others. Many of these have been excluded using the various modalities and assessment as noted on the chart. At the present time, the diagnosis given seems to be the most likely COVID infection.  Upon arrival, vitals signs show blood pressure 130/87 with a pulse of 126.  Temperature 38.6  C.  Respirations 18 oxygenation 95% on room air.  The patient is alert and oriented no distress.  No respiratory abnormalities noted.  Patient does have tachycardic heart rate.  Patient skin is warm to the touch.  No rash present.  Nursing did do ECG on arrival which showed no acute abnormalities other than the tachycardia.  Influenza, RSV, COVID test was conducted which is positive for COVID.  Given acetaminophen and ibuprofen for fever control.  Discussed monoclonal antibody therapy with patient and this was deferred.  For this reason we will discharge patient home to do acetaminophen and ibuprofen.  Hydration therapy education given.  Follow-up with PCP as needed and return to ER if new or worsening symptoms occur.  Patient verbalized  understanding agrees to plan of care.  Patient discharged home.        Critical Care Time: None    Impression and plan discussed with patient. Questions answered, concerns addressed, indications for urgent re-evaluation reviewed, and  given. Patient/Parent/Caregiver agree with treatment plan and have no further questions at this time.  AVS provided at discharge.    This note was created by the Dragon Voice Dictation System. Inadvertent typographical errors, due to software recognition problems, may still exist.             New Prescriptions    No medications on file       Final diagnoses:   COVID-19 virus infection       2/18/2024   [facility]       Brice Gunter, GREG CNP  02/18/24 2128

## 2024-02-19 NOTE — DISCHARGE INSTRUCTIONS
Fever/pain control:   If your past medical conditions, allergies, current medications, or current status does not prevent you from using acetaminophen and/or ibuprofen, use the following: Acetaminophen 650-1000 mg every 6 hours as needed for pain/fever.  Ibuprofen 400 mg every 6 hours as needed for pain/fever.  These can be taken together if wanted.    Remember that these are for AS NEEDED.  If not needed, do not take.  It is important to remember that fever is beneficial in the healing process.  It is usually recommended to start using medication if/when temperature is =102 F  or when you have discomfort.  Studies show that not using medication for fever control shows better outcomes...         Learn about the COVID19 Study:   A treatment study for those infected with COVID 19.      A research trial conducted by   [facility]    Background Information    Since appearing in [facility] in 2019, the severe acute respiratory syndrome coronavirus 2 (SARS-CoV-2) has caused disease (COVID-19) in over 110,000 people. Over 4,000 people have  in the past several months across 80 countries. The mortality is estimated at up to 3.4% and is especially dangerous for the elderly.    At present there is no current specific treatment for patients with COVID-19. The novel (new) coronavirus is closely related to the severe acute respiratory syndrome coronavirus (SARS-CoV) which caused an outbreak of disease (SARS) in 2003. Attempts to develop a vaccine since SARS have been unsuccessful. New vaccines will take time and may or may not be effective for this or future coronavirus pandemics (wide-spread infections of people around the globe). So far, health officials have been appropriately focused on screening, identification, and containment within and between governments to combat the spread of COVID-19.     Given its rapid spread, patients with COVID-19 would greatly benefit from treatments with an  established safety profile readily available for immediate use if the treatment is effective.     What is this study?    This is a multi-center ([facility] and [facility]), double-blinded study of COVID-19 infected patients randomized 1:1 to daily losartan or placebo for 10 days or treatment failure (hospital admission).    Why is this study being done?    The virus COVID-19 uses a specific protein on the surface of your cells to enter the cell. This protein is important to protect the lung from circulating hormones. COVID-19 blocks this protein and damages the lungs. In this study, we want to see if giving people a medication usually used for high blood pressure (called Losartan) that blocks the activity of this lung damaging hormone might help reduce problems with breathing while you recover from COVID-19.      What Happens in the Study?    People who meet eligibility criteria and agree to participate in this research study will be randomly assigned to receive either Losartan or a placebo (a pill that looks just like Losartan but doesn't have any medicine in it). People enrolled in the research study have a 50:50 chance of taking either pill during this study, like flipping a coin. People will take the study medication for 10 days, at home, with some follow-up phone calls from the research team (up to 28 days from enrolling in the study).    People will also be asked to submit a saliva swab (by pre-paid mail or ) to the research team for analysis.     What if I want to be included in the study?       If you test positive for COVID-19, someone from the COVID-19 testing center will contact you. If you want to learn more or participate in the study, contact the study team. This can be done by calling the hotline, sending an email or visiting the website to answer a few additional pre-screening questions to see if you qualify and to speak with a research study member.    To learn more  about COVID19 you can visit the [facility] Website at: [email or web address]    Contact Us  COVID19 Study  [place], [place]    Phone: [phone]  E-mail: [email or web address]    Randomized Controlled Trial of Losartan for Patients with COVID-19 is a research study to reduce severe respiratory symptoms/lung damage from COVID-19.   ____________________________________  The research study has been reviewed by the [facility] Institutional Review Board (IRB).    [facility] protocol number: [id number]  [facility] IRB study number:  [id number]    Website:  [email or web address]

## 2024-03-31 ENCOUNTER — HEALTH MAINTENANCE LETTER (OUTPATIENT)
Age: 74
End: 2024-03-31

## 2024-08-17 ENCOUNTER — HEALTH MAINTENANCE LETTER (OUTPATIENT)
Age: 74
End: 2024-08-17

## 2024-12-28 ENCOUNTER — HEALTH MAINTENANCE LETTER (OUTPATIENT)
Age: 74
End: 2024-12-28

## 2025-01-26 ENCOUNTER — HEALTH MAINTENANCE LETTER (OUTPATIENT)
Age: 75
End: 2025-01-26

## 2025-04-13 ENCOUNTER — HEALTH MAINTENANCE LETTER (OUTPATIENT)
Age: 75
End: 2025-04-13

## 2025-07-27 ENCOUNTER — HEALTH MAINTENANCE LETTER (OUTPATIENT)
Age: 75
End: 2025-07-27